# Patient Record
Sex: MALE | Race: ASIAN | NOT HISPANIC OR LATINO | URBAN - METROPOLITAN AREA
[De-identification: names, ages, dates, MRNs, and addresses within clinical notes are randomized per-mention and may not be internally consistent; named-entity substitution may affect disease eponyms.]

---

## 2018-04-18 ENCOUNTER — INPATIENT (INPATIENT)
Facility: HOSPITAL | Age: 73
LOS: 2 days | Discharge: HOME | DRG: 439 | End: 2018-04-21
Attending: INTERNAL MEDICINE | Admitting: INTERNAL MEDICINE
Payer: MEDICARE

## 2018-04-18 VITALS
RESPIRATION RATE: 17 BRPM | OXYGEN SATURATION: 96 % | DIASTOLIC BLOOD PRESSURE: 80 MMHG | TEMPERATURE: 98 F | HEART RATE: 67 BPM | SYSTOLIC BLOOD PRESSURE: 172 MMHG

## 2018-04-18 DIAGNOSIS — E11.9 TYPE 2 DIABETES MELLITUS WITHOUT COMPLICATIONS: ICD-10-CM

## 2018-04-18 DIAGNOSIS — I10 ESSENTIAL (PRIMARY) HYPERTENSION: ICD-10-CM

## 2018-04-18 DIAGNOSIS — K85.90 ACUTE PANCREATITIS WITHOUT NECROSIS OR INFECTION, UNSPECIFIED: ICD-10-CM

## 2018-04-18 DIAGNOSIS — I63.9 CEREBRAL INFARCTION, UNSPECIFIED: ICD-10-CM

## 2018-04-18 DIAGNOSIS — Z29.9 ENCOUNTER FOR PROPHYLACTIC MEASURES, UNSPECIFIED: ICD-10-CM

## 2018-04-18 DIAGNOSIS — N17.9 ACUTE KIDNEY FAILURE, UNSPECIFIED: ICD-10-CM

## 2018-04-18 LAB
ALBUMIN SERPL ELPH-MCNC: 3.6 G/DL — SIGNIFICANT CHANGE UP (ref 3.5–5)
ALP SERPL-CCNC: 72 U/L — SIGNIFICANT CHANGE UP (ref 40–120)
ALT FLD-CCNC: 26 U/L DA — SIGNIFICANT CHANGE UP (ref 10–60)
ANION GAP SERPL CALC-SCNC: 8 MMOL/L — SIGNIFICANT CHANGE UP (ref 5–17)
AST SERPL-CCNC: 23 U/L — SIGNIFICANT CHANGE UP (ref 10–40)
BASOPHILS # BLD AUTO: 0.1 K/UL — SIGNIFICANT CHANGE UP (ref 0–0.2)
BASOPHILS NFR BLD AUTO: 1.4 % — SIGNIFICANT CHANGE UP (ref 0–2)
BILIRUB SERPL-MCNC: 0.8 MG/DL — SIGNIFICANT CHANGE UP (ref 0.2–1.2)
BUN SERPL-MCNC: 35 MG/DL — HIGH (ref 7–18)
CALCIUM SERPL-MCNC: 10 MG/DL — SIGNIFICANT CHANGE UP (ref 8.4–10.5)
CHLORIDE SERPL-SCNC: 99 MMOL/L — SIGNIFICANT CHANGE UP (ref 96–108)
CO2 SERPL-SCNC: 29 MMOL/L — SIGNIFICANT CHANGE UP (ref 22–31)
CREAT SERPL-MCNC: 1.99 MG/DL — HIGH (ref 0.5–1.3)
EOSINOPHIL # BLD AUTO: 0.5 K/UL — SIGNIFICANT CHANGE UP (ref 0–0.5)
EOSINOPHIL NFR BLD AUTO: 5.2 % — SIGNIFICANT CHANGE UP (ref 0–6)
GLUCOSE BLDC GLUCOMTR-MCNC: 157 MG/DL — HIGH (ref 70–99)
GLUCOSE SERPL-MCNC: 128 MG/DL — HIGH (ref 70–99)
HCT VFR BLD CALC: 49.7 % — SIGNIFICANT CHANGE UP (ref 39–50)
HGB BLD-MCNC: 15.8 G/DL — SIGNIFICANT CHANGE UP (ref 13–17)
LIDOCAIN IGE QN: 1444 U/L — HIGH (ref 73–393)
LYMPHOCYTES # BLD AUTO: 3.2 K/UL — SIGNIFICANT CHANGE UP (ref 1–3.3)
LYMPHOCYTES # BLD AUTO: 31.2 % — SIGNIFICANT CHANGE UP (ref 13–44)
MCHC RBC-ENTMCNC: 30.1 PG — SIGNIFICANT CHANGE UP (ref 27–34)
MCHC RBC-ENTMCNC: 31.7 GM/DL — LOW (ref 32–36)
MCV RBC AUTO: 95 FL — SIGNIFICANT CHANGE UP (ref 80–100)
MONOCYTES # BLD AUTO: 0.5 K/UL — SIGNIFICANT CHANGE UP (ref 0–0.9)
MONOCYTES NFR BLD AUTO: 5.2 % — SIGNIFICANT CHANGE UP (ref 2–14)
NEUTROPHILS # BLD AUTO: 5.8 K/UL — SIGNIFICANT CHANGE UP (ref 1.8–7.4)
NEUTROPHILS NFR BLD AUTO: 57 % — SIGNIFICANT CHANGE UP (ref 43–77)
PLATELET # BLD AUTO: 263 K/UL — SIGNIFICANT CHANGE UP (ref 150–400)
POTASSIUM SERPL-MCNC: 4.4 MMOL/L — SIGNIFICANT CHANGE UP (ref 3.5–5.3)
POTASSIUM SERPL-SCNC: 4.4 MMOL/L — SIGNIFICANT CHANGE UP (ref 3.5–5.3)
PROT SERPL-MCNC: 8.4 G/DL — HIGH (ref 6–8.3)
RBC # BLD: 5.24 M/UL — SIGNIFICANT CHANGE UP (ref 4.2–5.8)
RBC # FLD: 13.2 % — SIGNIFICANT CHANGE UP (ref 10.3–14.5)
SODIUM SERPL-SCNC: 136 MMOL/L — SIGNIFICANT CHANGE UP (ref 135–145)
TROPONIN I SERPL-MCNC: <0.015 NG/ML — SIGNIFICANT CHANGE UP (ref 0–0.04)
WBC # BLD: 10.1 K/UL — SIGNIFICANT CHANGE UP (ref 3.8–10.5)
WBC # FLD AUTO: 10.1 K/UL — SIGNIFICANT CHANGE UP (ref 3.8–10.5)

## 2018-04-18 PROCEDURE — 74176 CT ABD & PELVIS W/O CONTRAST: CPT | Mod: 26

## 2018-04-18 PROCEDURE — 99285 EMERGENCY DEPT VISIT HI MDM: CPT

## 2018-04-18 RX ORDER — ATORVASTATIN CALCIUM 80 MG/1
40 TABLET, FILM COATED ORAL AT BEDTIME
Qty: 0 | Refills: 0 | Status: DISCONTINUED | OUTPATIENT
Start: 2018-04-18 | End: 2018-04-21

## 2018-04-18 RX ORDER — MORPHINE SULFATE 50 MG/1
1 CAPSULE, EXTENDED RELEASE ORAL EVERY 6 HOURS
Qty: 0 | Refills: 0 | Status: DISCONTINUED | OUTPATIENT
Start: 2018-04-18 | End: 2018-04-21

## 2018-04-18 RX ORDER — SOD,AMMONIUM,POTASSIUM LACTATE
1 CREAM (GRAM) TOPICAL
Qty: 0 | Refills: 0 | Status: DISCONTINUED | OUTPATIENT
Start: 2018-04-18 | End: 2018-04-21

## 2018-04-18 RX ORDER — SODIUM CHLORIDE 9 MG/ML
1000 INJECTION, SOLUTION INTRAVENOUS
Qty: 0 | Refills: 0 | Status: DISCONTINUED | OUTPATIENT
Start: 2018-04-18 | End: 2018-04-20

## 2018-04-18 RX ORDER — MORPHINE SULFATE 50 MG/1
2 CAPSULE, EXTENDED RELEASE ORAL ONCE
Qty: 0 | Refills: 0 | Status: DISCONTINUED | OUTPATIENT
Start: 2018-04-18 | End: 2018-04-18

## 2018-04-18 RX ORDER — LATANOPROST 0.05 MG/ML
1 SOLUTION/ DROPS OPHTHALMIC; TOPICAL AT BEDTIME
Qty: 0 | Refills: 0 | Status: DISCONTINUED | OUTPATIENT
Start: 2018-04-18 | End: 2018-04-21

## 2018-04-18 RX ORDER — ASPIRIN/CALCIUM CARB/MAGNESIUM 324 MG
81 TABLET ORAL DAILY
Qty: 0 | Refills: 0 | Status: DISCONTINUED | OUTPATIENT
Start: 2018-04-18 | End: 2018-04-21

## 2018-04-18 RX ORDER — INSULIN LISPRO 100/ML
VIAL (ML) SUBCUTANEOUS
Qty: 0 | Refills: 0 | Status: DISCONTINUED | OUTPATIENT
Start: 2018-04-18 | End: 2018-04-21

## 2018-04-18 RX ORDER — SODIUM CHLORIDE 9 MG/ML
1000 INJECTION INTRAMUSCULAR; INTRAVENOUS; SUBCUTANEOUS ONCE
Qty: 0 | Refills: 0 | Status: COMPLETED | OUTPATIENT
Start: 2018-04-18 | End: 2018-04-18

## 2018-04-18 RX ADMIN — MORPHINE SULFATE 2 MILLIGRAM(S): 50 CAPSULE, EXTENDED RELEASE ORAL at 16:27

## 2018-04-18 RX ADMIN — SODIUM CHLORIDE 1000 MILLILITER(S): 9 INJECTION INTRAMUSCULAR; INTRAVENOUS; SUBCUTANEOUS at 18:44

## 2018-04-18 RX ADMIN — MORPHINE SULFATE 2 MILLIGRAM(S): 50 CAPSULE, EXTENDED RELEASE ORAL at 18:45

## 2018-04-18 NOTE — H&P ADULT - PROBLEM SELECTOR PLAN 1
Patient presented with abdominal pain x 4 days  Labs significant for elevated Lipase  CT abdomen negative for gall stone  Denies alcohol use.   NPO. advance diet clinically. IV hydration with LR. Patient presented with abdominal pain x 4 days  Labs significant for elevated Lipase  CT abdomen negative for gall stone  Denies alcohol use. TG minimally elevated (not significant for pancreatitis)  NPO. advance diet clinically. IV hydration with LR. Patient presented with abdominal pain x 4 days  Labs significant for elevated Lipase  CT abdomen negative for gall stone  Denies alcohol use. TG minimally elevated (not significant for pancreatitis)  NPO. advance diet clinically. IV hydration with LR.  GI Dr Hardin

## 2018-04-18 NOTE — H&P ADULT - NSHPLABSRESULTS_GEN_ALL_CORE
Vital Signs Last 24 Hrs  T(C): 36.3 (18 Apr 2018 23:51), Max: 37.1 (18 Apr 2018 21:18)  T(F): 97.4 (18 Apr 2018 23:51), Max: 98.7 (18 Apr 2018 21:18)  HR: 70 (18 Apr 2018 23:51) (67 - 78)  BP: 170/76 (18 Apr 2018 23:51) (136/64 - 172/80)  BP(mean): --  RR: 18 (18 Apr 2018 23:51) (17 - 18)  SpO2: 98% (18 Apr 2018 23:51) (96% - 99%)

## 2018-04-18 NOTE — ED PROVIDER NOTE - MEDICAL DECISION MAKING DETAILS
73 y/o M pt presents with generalized abdominal pain x3-4 days. Pt is tender on examination. Will check CT Abd/Pel as well as labs, EKG, UA, and will reassess.

## 2018-04-18 NOTE — H&P ADULT - HISTORY OF PRESENT ILLNESS
71 y/o M pt with PMHx of CVA, HTN and DM sent by PMD to ED c/o generalized abdominal pain x3-4 days. Pt describes abdominal pain as non-radiating, non refered. Pt reports having constipation. Denies h/o alcohol use. Pt denies fever, chills, nausea, vomiting, CP, dysuria, burning with urination, or any other complaints.   SH: Denies alcohol use. Quit smoking 1 year ago. Denies illicit drug use

## 2018-04-18 NOTE — ED PROVIDER NOTE - CARE PLAN
Principal Discharge DX:	Acute pancreatitis, unspecified complication status, unspecified pancreatitis type  Secondary Diagnosis:	Renal insufficiency

## 2018-04-18 NOTE — H&P ADULT - PROBLEM SELECTOR PLAN 6
IMPROVE VTE score: 2, dvt prophylaxis.  [ ] Previous VTE                                                3  [ ] Thrombophilia                                             2  [ ] Lower limb paralysis                                  2        (unable to hold up >15 seconds)    [ ] Current Cancer (within 6 months)            2   [x] Immobilization > 24 hrs                              1  [ ] ICU/CCU stay > 24 hours                            1  [x] Age > 60                                                         1

## 2018-04-18 NOTE — ED PROVIDER NOTE - CONDUCTED A DETAILED DISCUSSION WITH PATIENT AND/OR GUARDIAN REGARDING, MDM
need for outpatient follow-up/lab results/radiology results radiology results/need to admit/lab results

## 2018-04-18 NOTE — ED PROVIDER NOTE - PROGRESS NOTE DETAILS
Pt with elevated lipase c/w acute pancreatitis and abnormal renal function (no prior lab values available for comparison at this time), presumed to be acute.  Pt's son says he knows of Dx of lumbar compression fracture, not acute.  Informed Dr. Glenis Nunes for admission.

## 2018-04-18 NOTE — H&P ADULT - PROBLEM SELECTOR PLAN 2
Cr 1.99 with baseline around 1.2  Likely prerenal due to poor PO intake die to abdominal pain  IV fluids. Iv hydration. Follow BMP

## 2018-04-19 LAB
ALBUMIN SERPL ELPH-MCNC: 3.4 G/DL — LOW (ref 3.5–5)
ALP SERPL-CCNC: 69 U/L — SIGNIFICANT CHANGE UP (ref 40–120)
ALT FLD-CCNC: 29 U/L DA — SIGNIFICANT CHANGE UP (ref 10–60)
ANION GAP SERPL CALC-SCNC: 8 MMOL/L — SIGNIFICANT CHANGE UP (ref 5–17)
APPEARANCE UR: CLEAR — SIGNIFICANT CHANGE UP
APPEARANCE UR: CLEAR — SIGNIFICANT CHANGE UP
AST SERPL-CCNC: 24 U/L — SIGNIFICANT CHANGE UP (ref 10–40)
BILIRUB SERPL-MCNC: 1 MG/DL — SIGNIFICANT CHANGE UP (ref 0.2–1.2)
BILIRUB UR-MCNC: NEGATIVE — SIGNIFICANT CHANGE UP
BILIRUB UR-MCNC: NEGATIVE — SIGNIFICANT CHANGE UP
BUN SERPL-MCNC: 31 MG/DL — HIGH (ref 7–18)
CALCIUM SERPL-MCNC: 9.3 MG/DL — SIGNIFICANT CHANGE UP (ref 8.4–10.5)
CHLORIDE SERPL-SCNC: 100 MMOL/L — SIGNIFICANT CHANGE UP (ref 96–108)
CHOLEST SERPL-MCNC: 95 MG/DL — SIGNIFICANT CHANGE UP (ref 10–199)
CO2 SERPL-SCNC: 30 MMOL/L — SIGNIFICANT CHANGE UP (ref 22–31)
COLOR SPEC: YELLOW — SIGNIFICANT CHANGE UP
COLOR SPEC: YELLOW — SIGNIFICANT CHANGE UP
CREAT ?TM UR-MCNC: 112 MG/DL — SIGNIFICANT CHANGE UP
CREAT SERPL-MCNC: 1.84 MG/DL — HIGH (ref 0.5–1.3)
DIFF PNL FLD: NEGATIVE — SIGNIFICANT CHANGE UP
DIFF PNL FLD: NEGATIVE — SIGNIFICANT CHANGE UP
ETHANOL SERPL-MCNC: <3 MG/DL — SIGNIFICANT CHANGE UP (ref 0–10)
GLUCOSE BLDC GLUCOMTR-MCNC: 116 MG/DL — HIGH (ref 70–99)
GLUCOSE BLDC GLUCOMTR-MCNC: 117 MG/DL — HIGH (ref 70–99)
GLUCOSE BLDC GLUCOMTR-MCNC: 140 MG/DL — HIGH (ref 70–99)
GLUCOSE BLDC GLUCOMTR-MCNC: 152 MG/DL — HIGH (ref 70–99)
GLUCOSE SERPL-MCNC: 170 MG/DL — HIGH (ref 70–99)
GLUCOSE UR QL: NEGATIVE — SIGNIFICANT CHANGE UP
GLUCOSE UR QL: NEGATIVE — SIGNIFICANT CHANGE UP
HBA1C BLD-MCNC: 11.6 % — HIGH (ref 4–5.6)
HDLC SERPL-MCNC: 36 MG/DL — LOW (ref 40–125)
KETONES UR-MCNC: NEGATIVE — SIGNIFICANT CHANGE UP
KETONES UR-MCNC: NEGATIVE — SIGNIFICANT CHANGE UP
LEUKOCYTE ESTERASE UR-ACNC: NEGATIVE — SIGNIFICANT CHANGE UP
LEUKOCYTE ESTERASE UR-ACNC: NEGATIVE — SIGNIFICANT CHANGE UP
LIPID PNL WITH DIRECT LDL SERPL: 47 MG/DL — SIGNIFICANT CHANGE UP
MAGNESIUM SERPL-MCNC: 2.2 MG/DL — SIGNIFICANT CHANGE UP (ref 1.6–2.6)
NITRITE UR-MCNC: NEGATIVE — SIGNIFICANT CHANGE UP
NITRITE UR-MCNC: NEGATIVE — SIGNIFICANT CHANGE UP
OSMOLALITY UR: 564 MOS/KG — SIGNIFICANT CHANGE UP (ref 50–1200)
PH UR: 6 — SIGNIFICANT CHANGE UP (ref 5–8)
PH UR: 7 — SIGNIFICANT CHANGE UP (ref 5–8)
PHOSPHATE SERPL-MCNC: 3.5 MG/DL — SIGNIFICANT CHANGE UP (ref 2.5–4.5)
POTASSIUM SERPL-MCNC: 5 MMOL/L — SIGNIFICANT CHANGE UP (ref 3.5–5.3)
POTASSIUM SERPL-SCNC: 5 MMOL/L — SIGNIFICANT CHANGE UP (ref 3.5–5.3)
PROT SERPL-MCNC: 7.8 G/DL — SIGNIFICANT CHANGE UP (ref 6–8.3)
PROT UR-MCNC: 100
PROT UR-MCNC: 100
SODIUM SERPL-SCNC: 138 MMOL/L — SIGNIFICANT CHANGE UP (ref 135–145)
SODIUM UR-SCNC: 72 MMOL/L — SIGNIFICANT CHANGE UP (ref 40–220)
SP GR SPEC: 1.01 — SIGNIFICANT CHANGE UP (ref 1.01–1.02)
SP GR SPEC: 1.02 — SIGNIFICANT CHANGE UP (ref 1.01–1.02)
TOTAL CHOLESTEROL/HDL RATIO MEASUREMENT: 2.6 RATIO — LOW (ref 3.4–9.6)
TRIGL SERPL-MCNC: 237 MG/DL — HIGH (ref 10–149)
TRIGL SERPL-MCNC: 62 MG/DL — SIGNIFICANT CHANGE UP (ref 10–149)
TSH SERPL-MCNC: 2.47 UU/ML — SIGNIFICANT CHANGE UP (ref 0.34–4.82)
UROBILINOGEN FLD QL: NEGATIVE — SIGNIFICANT CHANGE UP
UROBILINOGEN FLD QL: NEGATIVE — SIGNIFICANT CHANGE UP
VIT B12 SERPL-MCNC: 688 PG/ML — SIGNIFICANT CHANGE UP (ref 232–1245)

## 2018-04-19 RX ORDER — AMLODIPINE BESYLATE AND BENAZEPRIL HYDROCHLORIDE 10; 20 MG/1; MG/1
0 CAPSULE ORAL
Qty: 30 | Refills: 0 | COMMUNITY

## 2018-04-19 RX ORDER — LATANOPROST 0.05 MG/ML
0 SOLUTION/ DROPS OPHTHALMIC; TOPICAL
Qty: 2.5 | Refills: 0 | COMMUNITY

## 2018-04-19 RX ORDER — INSULIN GLARGINE 100 [IU]/ML
10 INJECTION, SOLUTION SUBCUTANEOUS AT BEDTIME
Qty: 0 | Refills: 0 | Status: DISCONTINUED | OUTPATIENT
Start: 2018-04-19 | End: 2018-04-21

## 2018-04-19 RX ORDER — AMLODIPINE BESYLATE 2.5 MG/1
5 TABLET ORAL DAILY
Qty: 0 | Refills: 0 | Status: DISCONTINUED | OUTPATIENT
Start: 2018-04-19 | End: 2018-04-21

## 2018-04-19 RX ADMIN — Medication 1 APPLICATION(S): at 06:08

## 2018-04-19 RX ADMIN — Medication 1 APPLICATION(S): at 02:39

## 2018-04-19 RX ADMIN — Medication 1 APPLICATION(S): at 17:13

## 2018-04-19 RX ADMIN — LATANOPROST 1 DROP(S): 0.05 SOLUTION/ DROPS OPHTHALMIC; TOPICAL at 21:35

## 2018-04-19 RX ADMIN — LATANOPROST 1 DROP(S): 0.05 SOLUTION/ DROPS OPHTHALMIC; TOPICAL at 02:40

## 2018-04-19 RX ADMIN — Medication 81 MILLIGRAM(S): at 11:27

## 2018-04-19 RX ADMIN — AMLODIPINE BESYLATE 5 MILLIGRAM(S): 2.5 TABLET ORAL at 01:17

## 2018-04-19 RX ADMIN — SODIUM CHLORIDE 150 MILLILITER(S): 9 INJECTION, SOLUTION INTRAVENOUS at 01:25

## 2018-04-19 RX ADMIN — ATORVASTATIN CALCIUM 40 MILLIGRAM(S): 80 TABLET, FILM COATED ORAL at 21:34

## 2018-04-19 RX ADMIN — INSULIN GLARGINE 10 UNIT(S): 100 INJECTION, SOLUTION SUBCUTANEOUS at 21:34

## 2018-04-19 NOTE — PROGRESS NOTE ADULT - ASSESSMENT
73 y/o M pt with PMHx of CVA, HTN and DM sent by PMD to ED c/o generalized abdominal pain x3-4 days admitted for acute pancreatitis.     1. Acute pancreatitis  - unclear etiology ; no gall stones seen on CT hence will get US   - alcohol and urine toxicology level to be done   - no history of recent instrumentation   - diet advanced to clears liquid today; patient had a BM today     2. YANNICK  - BUN/Cr <20   - will get UA and urine studies to evaluate further     3. DVT/GI ppx 71 y/o M pt with PMHx of CVA, HTN and DM sent by PMD to ED c/o generalized abdominal pain x3-4 days admitted for acute pancreatitis.     1. Acute pancreatitis  - unclear etiology ; no gall stones seen on CT hence will get US   - alcohol and urine toxicology level to be done   - no history of recent instrumentation   - diet advanced to clears liquid today; patient had a BM today     2. YANNICK  - BUN/Cr <20   - will get UA and urine studies to evaluate further     3. Poorly controlled DM   - A1c 11.6   - BG controlled   - started on lantus 10 units     3. DVT/GI ppx

## 2018-04-20 LAB
ANION GAP SERPL CALC-SCNC: 7 MMOL/L — SIGNIFICANT CHANGE UP (ref 5–17)
BASOPHILS # BLD AUTO: 0.1 K/UL — SIGNIFICANT CHANGE UP (ref 0–0.2)
BASOPHILS NFR BLD AUTO: 1.3 % — SIGNIFICANT CHANGE UP (ref 0–2)
BUN SERPL-MCNC: 20 MG/DL — HIGH (ref 7–18)
CALCIUM SERPL-MCNC: 8.9 MG/DL — SIGNIFICANT CHANGE UP (ref 8.4–10.5)
CHLORIDE SERPL-SCNC: 102 MMOL/L — SIGNIFICANT CHANGE UP (ref 96–108)
CO2 SERPL-SCNC: 29 MMOL/L — SIGNIFICANT CHANGE UP (ref 22–31)
CREAT SERPL-MCNC: 1.52 MG/DL — HIGH (ref 0.5–1.3)
EOSINOPHIL # BLD AUTO: 0.6 K/UL — HIGH (ref 0–0.5)
EOSINOPHIL NFR BLD AUTO: 8 % — HIGH (ref 0–6)
GLUCOSE BLDC GLUCOMTR-MCNC: 123 MG/DL — HIGH (ref 70–99)
GLUCOSE BLDC GLUCOMTR-MCNC: 155 MG/DL — HIGH (ref 70–99)
GLUCOSE BLDC GLUCOMTR-MCNC: 158 MG/DL — HIGH (ref 70–99)
GLUCOSE BLDC GLUCOMTR-MCNC: 163 MG/DL — HIGH (ref 70–99)
GLUCOSE SERPL-MCNC: 155 MG/DL — HIGH (ref 70–99)
HCT VFR BLD CALC: 47.5 % — SIGNIFICANT CHANGE UP (ref 39–50)
HGB BLD-MCNC: 15 G/DL — SIGNIFICANT CHANGE UP (ref 13–17)
LIDOCAIN IGE QN: 270 U/L — SIGNIFICANT CHANGE UP (ref 73–393)
LYMPHOCYTES # BLD AUTO: 2 K/UL — SIGNIFICANT CHANGE UP (ref 1–3.3)
LYMPHOCYTES # BLD AUTO: 25.1 % — SIGNIFICANT CHANGE UP (ref 13–44)
MCHC RBC-ENTMCNC: 30.1 PG — SIGNIFICANT CHANGE UP (ref 27–34)
MCHC RBC-ENTMCNC: 31.5 GM/DL — LOW (ref 32–36)
MCV RBC AUTO: 95.3 FL — SIGNIFICANT CHANGE UP (ref 80–100)
MONOCYTES # BLD AUTO: 0.7 K/UL — SIGNIFICANT CHANGE UP (ref 0–0.9)
MONOCYTES NFR BLD AUTO: 9.2 % — SIGNIFICANT CHANGE UP (ref 2–14)
NEUTROPHILS # BLD AUTO: 4.5 K/UL — SIGNIFICANT CHANGE UP (ref 1.8–7.4)
NEUTROPHILS NFR BLD AUTO: 56.4 % — SIGNIFICANT CHANGE UP (ref 43–77)
PLATELET # BLD AUTO: 270 K/UL — SIGNIFICANT CHANGE UP (ref 150–400)
POTASSIUM SERPL-MCNC: 4.3 MMOL/L — SIGNIFICANT CHANGE UP (ref 3.5–5.3)
POTASSIUM SERPL-SCNC: 4.3 MMOL/L — SIGNIFICANT CHANGE UP (ref 3.5–5.3)
RBC # BLD: 4.99 M/UL — SIGNIFICANT CHANGE UP (ref 4.2–5.8)
RBC # FLD: 13 % — SIGNIFICANT CHANGE UP (ref 10.3–14.5)
SODIUM SERPL-SCNC: 138 MMOL/L — SIGNIFICANT CHANGE UP (ref 135–145)
WBC # BLD: 8 K/UL — SIGNIFICANT CHANGE UP (ref 3.8–10.5)
WBC # FLD AUTO: 8 K/UL — SIGNIFICANT CHANGE UP (ref 3.8–10.5)

## 2018-04-20 PROCEDURE — 76700 US EXAM ABDOM COMPLETE: CPT | Mod: 26

## 2018-04-20 RX ORDER — SODIUM CHLORIDE 9 MG/ML
1000 INJECTION, SOLUTION INTRAVENOUS
Qty: 0 | Refills: 0 | Status: DISCONTINUED | OUTPATIENT
Start: 2018-04-20 | End: 2018-04-21

## 2018-04-20 RX ADMIN — Medication 81 MILLIGRAM(S): at 11:05

## 2018-04-20 RX ADMIN — LATANOPROST 1 DROP(S): 0.05 SOLUTION/ DROPS OPHTHALMIC; TOPICAL at 22:13

## 2018-04-20 RX ADMIN — SODIUM CHLORIDE 200 MILLILITER(S): 9 INJECTION, SOLUTION INTRAVENOUS at 22:11

## 2018-04-20 RX ADMIN — INSULIN GLARGINE 10 UNIT(S): 100 INJECTION, SOLUTION SUBCUTANEOUS at 21:30

## 2018-04-20 RX ADMIN — ATORVASTATIN CALCIUM 40 MILLIGRAM(S): 80 TABLET, FILM COATED ORAL at 22:11

## 2018-04-20 RX ADMIN — Medication 1: at 07:58

## 2018-04-20 RX ADMIN — Medication 1 APPLICATION(S): at 18:11

## 2018-04-20 RX ADMIN — Medication 1: at 16:33

## 2018-04-20 RX ADMIN — AMLODIPINE BESYLATE 5 MILLIGRAM(S): 2.5 TABLET ORAL at 05:16

## 2018-04-20 RX ADMIN — Medication 1 APPLICATION(S): at 05:16

## 2018-04-20 RX ADMIN — SODIUM CHLORIDE 200 MILLILITER(S): 9 INJECTION, SOLUTION INTRAVENOUS at 11:05

## 2018-04-20 NOTE — PROGRESS NOTE ADULT - ASSESSMENT
73 y/o M pt with PMHx of CVA, HTN and DM sent by PMD to ED c/o generalized abdominal pain x3-4 days admitted for acute pancreatitis.     1. Acute pancreatitis  - unclear etiology ; no gall stones seen on CT hence will get US   - alcohol and urine toxicology level to be done   - no history of recent instrumentation   - diet advanced to clears liquid today; patient had a BM today   gi follow up  2. YANNICK  - BUN/Cr <20   - will get UA and urine studies to evaluate further   ivf     3. Poorly controlled DM   - A1c 11.6   - BG controlled   - started on lantus 10 units       3. DVT/GI ppx

## 2018-04-20 NOTE — PROGRESS NOTE ADULT - ASSESSMENT
1. Abdominal pain (improving)  2. Pancreatitis    Suggestions:    1. Advance diet as tolerated  2. Protonix daily  3. Avoid NSAID  4. DVT prophylaxis

## 2018-04-20 NOTE — CONSULT NOTE ADULT - ASSESSMENT
73 y/o M pt with PMHx of CVA, HTN and DM sent by PMD to ED c/o generalized abdominal pain x3-4 days. Found to have un cont dm, Pt admits to taking all his meds given by his son. Does not recall fsg #
The etiology for abdominal pain in this patient can be due to:  1. Pancreatitis    Suggestions:    1. Follow up Lipase  2. NPO  3. IVF hyadration  4. Avoid NSAID  5. Abdominal sonogram  6. Protonix daily  7. DVT prophylaxis  8. Lipid profile

## 2018-04-20 NOTE — CONSULT NOTE ADULT - PROBLEM SELECTOR RECOMMENDATION 9
un cont with [id number]  agree with lantus 10 units  humalog prn for now  consider prandin and metformin as out pt  d/c januvia  d/w hs

## 2018-04-20 NOTE — PROGRESS NOTE ADULT - SUBJECTIVE AND OBJECTIVE BOX
INTERVAL HPI/ OVERNIGHT EVENTS: No major overnight events     VITAL SIGNS:  T(F): 97.6 (04-19-18 @ 05:30)  HR: 100 (04-19-18 @ 05:30)  BP: 156/78 (04-19-18 @ 05:30)  RR: 18 (04-19-18 @ 05:30)  SpO2: 100% (04-19-18 @ 05:30)  Wt(kg): --    PHYSICAL EXAM:    Constitutional: NAD, patient is alert and awake   Eyes: PERRL, sclera clear   ENMT: no external lesions   Neck: Supple, no JVD   Respiratory: CTAB  Cardiovascular: S1,S2 +, RMG neg  Gastrointestinal: soft, mild diffuse abdominal tenderness, BS + x 4  Extremities: no cyanosis, edema or clubbing   Vascular: pulses 2+  Neurological: AO x 3      MEDICATIONS  (STANDING):  amLODIPine   Tablet 5 milliGRAM(s) Oral daily  ammonium lactate 12% Lotion 1 Application(s) Topical two times a day  aspirin  chewable 81 milliGRAM(s) Oral daily  atorvastatin 40 milliGRAM(s) Oral at bedtime  insulin lispro (HumaLOG) corrective regimen sliding scale   SubCutaneous three times a day before meals  lactated ringers. 1000 milliLiter(s) (150 mL/Hr) IV Continuous <Continuous>  latanoprost 0.005% Ophthalmic Solution 1 Drop(s) Both EYES at bedtime    MEDICATIONS  (PRN):  morphine  - Injectable 1 milliGRAM(s) IV Push every 6 hours PRN Severe Pain (7 - 10)      Allergies    No Known Allergies    Intolerances        LABS:                                              15.8   10.1  )-----------( 263      ( 18 Apr 2018 16:24 )             49.7   04-19    138  |  100  |  31<H>  ----------------------------<  170<H>  5.0   |  30  |  1.84<H>    Ca    9.3      19 Apr 2018 07:24  Phos  3.5     04-19  Mg     2.2     04-19    TPro  7.8  /  Alb  3.4<L>  /  TBili  1.0  /  DBili  x   /  AST  24  /  ALT  29  /  AlkPhos  69  04-19
INTERVAL HPI/ OVERNIGHT EVENTS: No major overnight events     VITAL SIGNS:  T(F): 97.6 (04-19-18 @ 05:30)  HR: 100 (04-19-18 @ 05:30)  BP: 156/78 (04-19-18 @ 05:30)  RR: 18 (04-19-18 @ 05:30)  SpO2: 100% (04-19-18 @ 05:30)  Wt(kg): --    PHYSICAL EXAM:    Constitutional: NAD, patient is alert and awake   Eyes: PERRL, sclera clear   ENMT: no external lesions   Neck: Supple, no JVD   Respiratory: CTAB  Cardiovascular: S1,S2 +, RMG neg  Gastrointestinal: soft, no tenderness or distension, BS + x 4  Extremities: no cyanosis, edema or clubbing   Vascular: pulses 2+  Neurological: AO x 3      MEDICATIONS  (STANDING):  amLODIPine   Tablet 5 milliGRAM(s) Oral daily  ammonium lactate 12% Lotion 1 Application(s) Topical two times a day  aspirin  chewable 81 milliGRAM(s) Oral daily  atorvastatin 40 milliGRAM(s) Oral at bedtime  insulin lispro (HumaLOG) corrective regimen sliding scale   SubCutaneous three times a day before meals  lactated ringers. 1000 milliLiter(s) (150 mL/Hr) IV Continuous <Continuous>  latanoprost 0.005% Ophthalmic Solution 1 Drop(s) Both EYES at bedtime    MEDICATIONS  (PRN):  morphine  - Injectable 1 milliGRAM(s) IV Push every 6 hours PRN Severe Pain (7 - 10)      Allergies    No Known Allergies    Intolerances        LABS:                                            15.0   8.0   )-----------( 270      ( 20 Apr 2018 08:06 )             47.5   04-20    138  |  102  |  20<H>  ----------------------------<  155<H>  4.3   |  29  |  1.52<H>    Ca    8.9      20 Apr 2018 08:06  Phos  3.5     04-19  Mg     2.2     04-19    TPro  7.8  /  Alb  3.4<L>  /  TBili  1.0  /  DBili  x   /  AST  24  /  ALT  29  /  AlkPhos  69  04-19
INTERVAL HPI/ OVERNIGHT EVENTS: No major overnight events   .patient seen and examined    s doing better   MEDICATIONS  (STANDING):  amLODIPine   Tablet 5 milliGRAM(s) Oral daily  ammonium lactate 12% Lotion 1 Application(s) Topical two times a day  aspirin  chewable 81 milliGRAM(s) Oral daily  atorvastatin 40 milliGRAM(s) Oral at bedtime  insulin glargine Injectable (LANTUS) 10 Unit(s) SubCutaneous at bedtime  insulin lispro (HumaLOG) corrective regimen sliding scale   SubCutaneous three times a day before meals  lactated ringers. 1000 milliLiter(s) (150 mL/Hr) IV Continuous <Continuous>  latanoprost 0.005% Ophthalmic Solution 1 Drop(s) Both EYES at bedtime    MEDICATIONS  (PRN):  morphine  - Injectable 1 milliGRAM(s) IV Push every 6 hours PRN Severe Pain (7 - 10)      Vital Signs Last 24 Hrs  T(C): 36.5 (2018 05:08), Max: 37 (2018 14:20)  T(F): 97.7 (2018 05:08), Max: 98.6 (2018 14:20)  HR: 106 (2018 05:08) (70 - 106)  BP: 167/85 (2018 05:08) (129/57 - 171/80)  BP(mean): --  RR: 18 (2018 05:08) (18 - 18)  SpO2: 95% (2018 05:08) (94% - 98%)  PHYSICAL EXAM:    Constitutional: NAD, patient is alert and awake   Eyes: PERRL, sclera clear   ENMT: no external lesions   Neck: Supple, no JVD   Respiratory: CTAB  Cardiovascular: S1,S2 +, RMG neg  Gastrointestinal: soft, mild diffuse abdominal tenderness, BS + x 4  Extremities: no cyanosis, edema or clubbing   Vascular: pulses 2+  Neurological: AO x 3    .  Allergies    No Known Allergies    Intolerances      LABS:                        15.0   8.0   )-----------( 270      ( 2018 08:06 )             47.5     04-    138  |  100  |  31<H>  ----------------------------<  170<H>  5.0   |  30  |  1.84<H>    Ca    9.3      2018 07:24  Phos  3.5     04-19  Mg     2.2     -    TPro  7.8  /  Alb  3.4<L>  /  TBili  1.0  /  DBili  x   /  AST  24  /  ALT  29  /  AlkPhos  69        Urinalysis Basic - ( 2018 11:51 )    Color: Yellow / Appearance: Clear / S.010 / pH: x  Gluc: x / Ketone: Negative  / Bili: Negative / Urobili: Negative   Blood: x / Protein: 100 / Nitrite: Negative   Leuk Esterase: Negative / RBC: 0-2 /HPF / WBC 0-2 /HPF   Sq Epi: x / Non Sq Epi: Occasional /HPF / Bacteria: x        CARDIAC MARKERS ( 2018 16:24 )  <0.015 ng/mL / x     / x     / x     / x        Lipase, Serum: 1444 U/L (18 @ 16:24)                  LABS:                                              15.8   10.1  )-----------( 263      ( 2018 16:24 )             49.7   19    138  |  100  |  31<H>  ----------------------------<  170<H>  5.0   |  30  |  1.84<H>    Ca    9.3      2018 07:24  Phos  3.5     04-19  Mg     2.2     04-    TPro  7.8  /  Alb  3.4<L>  /  TBili  1.0  /  DBili  x   /  AST  24  /  ALT  29  /  AlkPhos  69  
[   ] ICU                                          [   ] CCU                                      [  X ] Medical Floor    Patient is comfortable. No new complaints reported. Patient reports less abdominal pain. No N/V, hematemesis, hematochezia, melena, fever, chills, chest pain, SOB, cough or diarrhea reported.    VITALS  Vital Signs Last 24 Hrs  T(C): 36.9 (20 Apr 2018 14:28), Max: 36.9 (20 Apr 2018 14:28)  T(F): 98.4 (20 Apr 2018 14:28), Max: 98.4 (20 Apr 2018 14:28)  HR: 96 (20 Apr 2018 14:28) (70 - 106)  BP: 167/82 (20 Apr 2018 14:28) (156/76 - 171/80)  BP(mean): --  RR: 18 (20 Apr 2018 14:28) (18 - 18)  SpO2: 99% (20 Apr 2018 14:28) (94% - 99%)         MEDICATIONS  (STANDING):  amLODIPine   Tablet 5 milliGRAM(s) Oral daily  ammonium lactate 12% Lotion 1 Application(s) Topical two times a day  aspirin  chewable 81 milliGRAM(s) Oral daily  atorvastatin 40 milliGRAM(s) Oral at bedtime  insulin glargine Injectable (LANTUS) 10 Unit(s) SubCutaneous at bedtime  insulin lispro (HumaLOG) corrective regimen sliding scale   SubCutaneous three times a day before meals  lactated ringers. 1000 milliLiter(s) (200 mL/Hr) IV Continuous <Continuous>  latanoprost 0.005% Ophthalmic Solution 1 Drop(s) Both EYES at bedtime    MEDICATIONS  (PRN):  morphine  - Injectable 1 milliGRAM(s) IV Push every 6 hours PRN Severe Pain (7 - 10)                            15.0   8.0   )-----------( 270      ( 20 Apr 2018 08:06 )             47.5       04-20    138  |  102  |  20<H>  ----------------------------<  155<H>  4.3   |  29  |  1.52<H>    Ca    8.9      20 Apr 2018 08:06  Phos  3.5     04-19  Mg     2.2     04-19    TPro  7.8  /  Alb  3.4<L>  /  TBili  1.0  /  DBili  x   /  AST  24  /  ALT  29  /  AlkPhos  69  04-19

## 2018-04-20 NOTE — PROGRESS NOTE ADULT - ASSESSMENT
71 y/o M pt with PMHx of CVA, HTN and DM sent by PMD to ED c/o generalized abdominal pain x3-4 days admitted for acute pancreatitis.     1. Acute pancreatitis  - spoke to son today, patient was recently started on Januvia (100 bid) 15 days ago  - no gall stones seen on CT hence will get US   - alcohol and urine toxicology level to be done   - no history of recent instrumentation   - diet advanced to soft today; patient had a BM today   - Dr Hardin, GI       2. YANNICK  - BUN/Cr <20   - Fena is 0.7 indicative of pre-renal etiology   - continue iv fluids   - Cr is 1.5 today     3. Poorly controlled DM   - A1c 11.6   - BG controlled   - started on lantus 10 units   - Dr Ji, endocrinology     3. DVT/GI ppx

## 2018-04-20 NOTE — PROGRESS NOTE ADULT - NEGATIVE ENMT SYMPTOMS
no throat pain/no dysphagia/no gum bleeding/no dry mouth/no nose bleeds/no hearing difficulty/no ear pain

## 2018-04-21 VITALS
RESPIRATION RATE: 19 BRPM | TEMPERATURE: 98 F | OXYGEN SATURATION: 98 % | HEART RATE: 87 BPM | DIASTOLIC BLOOD PRESSURE: 81 MMHG | SYSTOLIC BLOOD PRESSURE: 150 MMHG

## 2018-04-21 LAB
GLUCOSE BLDC GLUCOMTR-MCNC: 110 MG/DL — HIGH (ref 70–99)
GLUCOSE BLDC GLUCOMTR-MCNC: 128 MG/DL — HIGH (ref 70–99)

## 2018-04-21 PROCEDURE — 83690 ASSAY OF LIPASE: CPT

## 2018-04-21 PROCEDURE — 83935 ASSAY OF URINE OSMOLALITY: CPT

## 2018-04-21 PROCEDURE — 82607 VITAMIN B-12: CPT

## 2018-04-21 PROCEDURE — 84100 ASSAY OF PHOSPHORUS: CPT

## 2018-04-21 PROCEDURE — 84478 ASSAY OF TRIGLYCERIDES: CPT

## 2018-04-21 PROCEDURE — 83036 HEMOGLOBIN GLYCOSYLATED A1C: CPT

## 2018-04-21 PROCEDURE — 93005 ELECTROCARDIOGRAM TRACING: CPT

## 2018-04-21 PROCEDURE — 74176 CT ABD & PELVIS W/O CONTRAST: CPT

## 2018-04-21 PROCEDURE — 80048 BASIC METABOLIC PNL TOTAL CA: CPT

## 2018-04-21 PROCEDURE — 76700 US EXAM ABDOM COMPLETE: CPT

## 2018-04-21 PROCEDURE — 80307 DRUG TEST PRSMV CHEM ANLYZR: CPT

## 2018-04-21 PROCEDURE — 99285 EMERGENCY DEPT VISIT HI MDM: CPT | Mod: 25

## 2018-04-21 PROCEDURE — 84443 ASSAY THYROID STIM HORMONE: CPT

## 2018-04-21 PROCEDURE — 84484 ASSAY OF TROPONIN QUANT: CPT

## 2018-04-21 PROCEDURE — 80061 LIPID PANEL: CPT

## 2018-04-21 PROCEDURE — 82962 GLUCOSE BLOOD TEST: CPT

## 2018-04-21 PROCEDURE — 83735 ASSAY OF MAGNESIUM: CPT

## 2018-04-21 PROCEDURE — 81001 URINALYSIS AUTO W/SCOPE: CPT

## 2018-04-21 PROCEDURE — 84300 ASSAY OF URINE SODIUM: CPT

## 2018-04-21 PROCEDURE — 80053 COMPREHEN METABOLIC PANEL: CPT

## 2018-04-21 PROCEDURE — 85027 COMPLETE CBC AUTOMATED: CPT

## 2018-04-21 PROCEDURE — 96374 THER/PROPH/DIAG INJ IV PUSH: CPT | Mod: XU

## 2018-04-21 PROCEDURE — 82570 ASSAY OF URINE CREATININE: CPT

## 2018-04-21 RX ORDER — METFORMIN HYDROCHLORIDE 850 MG/1
1 TABLET ORAL
Qty: 60 | Refills: 0 | OUTPATIENT
Start: 2018-04-21 | End: 2018-05-20

## 2018-04-21 RX ORDER — INSULIN GLARGINE 100 [IU]/ML
10 INJECTION, SOLUTION SUBCUTANEOUS
Qty: 1 | Refills: 0
Start: 2018-04-21

## 2018-04-21 RX ORDER — ASPIRIN/CALCIUM CARB/MAGNESIUM 324 MG
1 TABLET ORAL
Qty: 0 | Refills: 0 | COMMUNITY
Start: 2018-04-21

## 2018-04-21 RX ORDER — ATORVASTATIN CALCIUM 80 MG/1
1 TABLET, FILM COATED ORAL
Qty: 30 | Refills: 0 | OUTPATIENT
Start: 2018-04-21 | End: 2018-05-20

## 2018-04-21 RX ORDER — METFORMIN HYDROCHLORIDE 850 MG/1
0 TABLET ORAL
Qty: 60 | Refills: 0 | COMMUNITY

## 2018-04-21 RX ORDER — BRIMONIDINE TARTRATE, TIMOLOL MALEATE 2; 5 MG/ML; MG/ML
0 SOLUTION/ DROPS OPHTHALMIC
Qty: 5 | Refills: 0 | COMMUNITY

## 2018-04-21 RX ORDER — REPAGLINIDE 1 MG/1
1 TABLET ORAL
Qty: 90 | Refills: 0 | OUTPATIENT
Start: 2018-04-21 | End: 2018-05-20

## 2018-04-21 RX ORDER — INSULIN GLARGINE 100 [IU]/ML
10 INJECTION, SOLUTION SUBCUTANEOUS
Qty: 0 | Refills: 0 | COMMUNITY

## 2018-04-21 RX ORDER — BENAZEPRIL HYDROCHLORIDE 40 MG/1
0 TABLET ORAL
Qty: 30 | Refills: 0 | COMMUNITY

## 2018-04-21 RX ORDER — AMLODIPINE BESYLATE 2.5 MG/1
10 TABLET ORAL ONCE
Qty: 0 | Refills: 0 | Status: DISCONTINUED | OUTPATIENT
Start: 2018-04-21 | End: 2018-04-21

## 2018-04-21 RX ORDER — HYDRALAZINE HCL 50 MG
5 TABLET ORAL ONCE
Qty: 0 | Refills: 0 | Status: COMPLETED | OUTPATIENT
Start: 2018-04-21 | End: 2018-04-21

## 2018-04-21 RX ORDER — INSULIN GLARGINE 100 [IU]/ML
10 INJECTION, SOLUTION SUBCUTANEOUS
Qty: 1 | Refills: 0 | OUTPATIENT
Start: 2018-04-21 | End: 2018-05-20

## 2018-04-21 RX ORDER — SITAGLIPTIN 50 MG/1
0 TABLET, FILM COATED ORAL
Qty: 30 | Refills: 0 | COMMUNITY

## 2018-04-21 RX ORDER — PIOGLITAZONE HYDROCHLORIDE 15 MG/1
0 TABLET ORAL
Qty: 30 | Refills: 0 | COMMUNITY

## 2018-04-21 RX ADMIN — Medication 81 MILLIGRAM(S): at 13:07

## 2018-04-21 RX ADMIN — SODIUM CHLORIDE 200 MILLILITER(S): 9 INJECTION, SOLUTION INTRAVENOUS at 05:17

## 2018-04-21 RX ADMIN — AMLODIPINE BESYLATE 5 MILLIGRAM(S): 2.5 TABLET ORAL at 05:18

## 2018-04-21 RX ADMIN — Medication 1 APPLICATION(S): at 05:18

## 2018-04-21 NOTE — DISCHARGE NOTE ADULT - MEDICATION SUMMARY - MEDICATIONS TO STOP TAKING
I will STOP taking the medications listed below when I get home from the hospital:    JANUVIA      TAB 100MG

## 2018-04-21 NOTE — DISCHARGE NOTE ADULT - CARE PLAN
Principal Discharge DX:	Acute pancreatitis, unspecified complication status, unspecified pancreatitis type  Goal:	resolution and prevention of future episodes  Assessment and plan of treatment:	you had acute inflammation of your pancreas due to medications likely Januvia hence please don't take any more of that. The CT scan of abdomen and US of the abdomen did not show any hepatobiliary disease. Triglyceride level was not elevated. Please follow up with GI Dr Hardin who evaluated you in the hospital for close monitoring.  Secondary Diagnosis:	DM (diabetes mellitus)  Goal:	Keep A1c<7  Assessment and plan of treatment:	Your A1c was 11 hence we started lantus 10 units at bedtime and added prandin 0.5 tid to your metformin regimen. please continue medications as directed and follow up with endocrinology for repeat A1c in 3 months.  Secondary Diagnosis:	Renal insufficiency  Goal:	Cr<1.2  Assessment and plan of treatment:	Your renal functions were abnormal sec to pre-renal azotemia that improved significantly with iv fluids. last Cr level was 1.5. Please f/u with PMD for repeat BMP in 1 week since you are on metformin and benzapril.

## 2018-04-21 NOTE — DISCHARGE NOTE ADULT - HOSPITAL COURSE
73 y/o M pt with PMHx of HTN and DM sent by PMD to ED c/o generalized abdominal pain x3-4 days. Labs significant for elevated Lipase. CT abdomen negative for gall stone Denies alcohol use. TG minimally elevated (not significant for pancreatitis)  NPO. advance diet clinically. IV hydration with LR.  GI Dr Hardin. Cr 1.99 with baseline around 1.2 Likely prerenal due to poor PO intake die to abdominal pain IV fluids. US abdomen neg for gall bladder disease. CT abdomen showed no pathology. Diet was advanced and tolerated well. Cr improved with Iv fluids.     Per attending stable for discharge.

## 2018-04-21 NOTE — DISCHARGE NOTE ADULT - MEDICATION SUMMARY - MEDICATIONS TO TAKE
I will START or STAY ON the medications listed below when I get home from the hospital:    aspirin 81 mg oral tablet, chewable  -- 1 tab(s) by mouth once a day  -- Indication: For Ppx    METFORMIN    TAB 1000MG  -- 1 unit(s) by mouth 2 times a day   -- Indication: For DM (diabetes mellitus)    Lantus 100 units/mL subcutaneous solution  -- 10 unit(s) subcutaneous once a day (at bedtime)  -- Indication: For DM (diabetes mellitus)    Prandin 0.5 mg oral tablet  -- 1 tab(s) by mouth 3 times a day (before meals)  -- Indication: For DM (diabetes mellitus)    atorvastatin 40 mg oral tablet  -- 1 tab(s) by mouth once a day (at bedtime)  -- Indication: For HLD    AMLOD/BENAZP CAP 5-10MG  -- Indication: For HTN (hypertension)    AMMONIUM LAC LOT 12%  -- Indication: For Skin    LATANOPROST  SOL 0.005%  -- Indication: For glaucoma I will START or STAY ON the medications listed below when I get home from the hospital:    aspirin 81 mg oral tablet, chewable  -- 1 tab(s) by mouth once a day  -- Indication: For Ppx    METFORMIN    TAB 1000MG  -- 1 unit(s) by mouth 2 times a day   -- Indication: For DM (diabetes mellitus)    Prandin 0.5 mg oral tablet  -- 1 tab(s) by mouth 3 times a day (before meals)  -- Indication: For DM (diabetes mellitus)    Lantus 100 units/mL subcutaneous solution  -- 10 unit(s) subcutaneous once a day (at bedtime) MDD:one month supply  -- Indication: For DM (diabetes mellitus)    atorvastatin 40 mg oral tablet  -- 1 tab(s) by mouth once a day (at bedtime)  -- Indication: For HLD    AMLOD/BENAZP CAP 5-10MG  -- Indication: For HTN (hypertension)    AMMONIUM LAC LOT 12%  -- Indication: For Skin    LATANOPROST  SOL 0.005%  -- Indication: For glaucoma

## 2018-04-21 NOTE — DISCHARGE NOTE ADULT - PLAN OF CARE
resolution and prevention of future episodes you had acute inflammation of your pancreas due to medications likely Januvia hence please don't take any more of that. The CT scan of abdomen and US of the abdomen did not show any hepatobiliary disease. Triglyceride level was not elevated. Please follow up with GI Dr Hardin who evaluated you in the hospital for close monitoring. Keep A1c<7 Your A1c was 11 hence we started lantus 10 units at bedtime and added prandin 0.5 tid to your metformin regimen. please continue medications as directed and follow up with endocrinology for repeat A1c in 3 months. Cr<1.2 Your renal functions were abnormal sec to pre-renal azotemia that improved significantly with iv fluids. last Cr level was 1.5. Please f/u with PMD for repeat BMP in 1 week since you are on metformin and benzapril.

## 2018-04-21 NOTE — DISCHARGE NOTE ADULT - PATIENT PORTAL LINK FT
You can access the [facility] Patient Portal, offered by [facility], by registering with the following website: [email or web address]

## 2018-04-21 NOTE — DISCHARGE NOTE ADULT - CARE PROVIDER_API CALL
Virgie Ji (PEDRO), EndocrinologyMetabDiabetes  [address] [place] [address]  Phone: [phone]  Fax: [phone]    Joel Hardin), Medicine  [address] [place] [address]  Phone: [phone]  Fax: [phone]

## 2020-02-07 ENCOUNTER — INPATIENT (INPATIENT)
Facility: HOSPITAL | Age: 75
LOS: 3 days | Discharge: HOME | DRG: 638 | End: 2020-02-11
Attending: INTERNAL MEDICINE | Admitting: INTERNAL MEDICINE
Payer: MEDICARE

## 2020-02-07 VITALS
HEIGHT: 66 IN | WEIGHT: 139.99 LBS | RESPIRATION RATE: 18 BRPM | OXYGEN SATURATION: 96 % | TEMPERATURE: 98 F | DIASTOLIC BLOOD PRESSURE: 85 MMHG | HEART RATE: 68 BPM | SYSTOLIC BLOOD PRESSURE: 196 MMHG

## 2020-02-07 DIAGNOSIS — I10 ESSENTIAL (PRIMARY) HYPERTENSION: ICD-10-CM

## 2020-02-07 LAB
ALBUMIN SERPL ELPH-MCNC: 3.4 G/DL — LOW (ref 3.5–5)
ALP SERPL-CCNC: 75 U/L — SIGNIFICANT CHANGE UP (ref 40–120)
ALT FLD-CCNC: 27 U/L DA — SIGNIFICANT CHANGE UP (ref 10–60)
ANION GAP SERPL CALC-SCNC: 9 MMOL/L — SIGNIFICANT CHANGE UP (ref 5–17)
AST SERPL-CCNC: 21 U/L — SIGNIFICANT CHANGE UP (ref 10–40)
BASE EXCESS BLDV CALC-SCNC: 2.5 MMOL/L — HIGH (ref -2–2)
BASOPHILS # BLD AUTO: 0.06 K/UL — SIGNIFICANT CHANGE UP (ref 0–0.2)
BASOPHILS NFR BLD AUTO: 0.8 % — SIGNIFICANT CHANGE UP (ref 0–2)
BILIRUB SERPL-MCNC: 0.7 MG/DL — SIGNIFICANT CHANGE UP (ref 0.2–1.2)
BUN SERPL-MCNC: 16 MG/DL — SIGNIFICANT CHANGE UP (ref 7–18)
CALCIUM SERPL-MCNC: 9 MG/DL — SIGNIFICANT CHANGE UP (ref 8.4–10.5)
CHLORIDE SERPL-SCNC: 96 MMOL/L — SIGNIFICANT CHANGE UP (ref 96–108)
CO2 SERPL-SCNC: 27 MMOL/L — SIGNIFICANT CHANGE UP (ref 22–31)
CREAT SERPL-MCNC: 1.58 MG/DL — HIGH (ref 0.5–1.3)
EOSINOPHIL # BLD AUTO: 0.8 K/UL — HIGH (ref 0–0.5)
EOSINOPHIL NFR BLD AUTO: 10.2 % — HIGH (ref 0–6)
GLUCOSE SERPL-MCNC: 468 MG/DL — CRITICAL HIGH (ref 70–99)
HCO3 BLDV-SCNC: 30 MMOL/L — HIGH (ref 21–29)
HCT VFR BLD CALC: 41.4 % — SIGNIFICANT CHANGE UP (ref 39–50)
HGB BLD-MCNC: 13.7 G/DL — SIGNIFICANT CHANGE UP (ref 13–17)
HOROWITZ INDEX BLDV+IHG-RTO: 21 — SIGNIFICANT CHANGE UP
IMM GRANULOCYTES NFR BLD AUTO: 0.4 % — SIGNIFICANT CHANGE UP (ref 0–1.5)
LYMPHOCYTES # BLD AUTO: 2.12 K/UL — SIGNIFICANT CHANGE UP (ref 1–3.3)
LYMPHOCYTES # BLD AUTO: 26.9 % — SIGNIFICANT CHANGE UP (ref 13–44)
MAGNESIUM SERPL-MCNC: 1.9 MG/DL — SIGNIFICANT CHANGE UP (ref 1.6–2.6)
MCHC RBC-ENTMCNC: 30.4 PG — SIGNIFICANT CHANGE UP (ref 27–34)
MCHC RBC-ENTMCNC: 33.1 GM/DL — SIGNIFICANT CHANGE UP (ref 32–36)
MCV RBC AUTO: 91.8 FL — SIGNIFICANT CHANGE UP (ref 80–100)
MONOCYTES # BLD AUTO: 0.68 K/UL — SIGNIFICANT CHANGE UP (ref 0–0.9)
MONOCYTES NFR BLD AUTO: 8.6 % — SIGNIFICANT CHANGE UP (ref 2–14)
NEUTROPHILS # BLD AUTO: 4.18 K/UL — SIGNIFICANT CHANGE UP (ref 1.8–7.4)
NEUTROPHILS NFR BLD AUTO: 53.1 % — SIGNIFICANT CHANGE UP (ref 43–77)
NRBC # BLD: 0 /100 WBCS — SIGNIFICANT CHANGE UP (ref 0–0)
NT-PROBNP SERPL-SCNC: 231 PG/ML — SIGNIFICANT CHANGE UP (ref 0–450)
PCO2 BLDV: 58 MMHG — HIGH (ref 35–50)
PH BLDV: 7.33 — LOW (ref 7.35–7.45)
PHOSPHATE SERPL-MCNC: 3.2 MG/DL — SIGNIFICANT CHANGE UP (ref 2.5–4.5)
PLATELET # BLD AUTO: 265 K/UL — SIGNIFICANT CHANGE UP (ref 150–400)
PO2 BLDV: 21 MMHG — LOW (ref 25–45)
POTASSIUM SERPL-MCNC: 4.3 MMOL/L — SIGNIFICANT CHANGE UP (ref 3.5–5.3)
POTASSIUM SERPL-SCNC: 4.3 MMOL/L — SIGNIFICANT CHANGE UP (ref 3.5–5.3)
PROT SERPL-MCNC: 7.9 G/DL — SIGNIFICANT CHANGE UP (ref 6–8.3)
RBC # BLD: 4.51 M/UL — SIGNIFICANT CHANGE UP (ref 4.2–5.8)
RBC # FLD: 14.5 % — SIGNIFICANT CHANGE UP (ref 10.3–14.5)
SAO2 % BLDV: 26 % — LOW (ref 67–88)
SODIUM SERPL-SCNC: 132 MMOL/L — LOW (ref 135–145)
TROPONIN I SERPL-MCNC: <0.015 NG/ML — SIGNIFICANT CHANGE UP (ref 0–0.04)
WBC # BLD: 7.87 K/UL — SIGNIFICANT CHANGE UP (ref 3.8–10.5)
WBC # FLD AUTO: 7.87 K/UL — SIGNIFICANT CHANGE UP (ref 3.8–10.5)

## 2020-02-07 PROCEDURE — 99285 EMERGENCY DEPT VISIT HI MDM: CPT

## 2020-02-07 PROCEDURE — 71045 X-RAY EXAM CHEST 1 VIEW: CPT | Mod: 26

## 2020-02-07 RX ORDER — METOPROLOL TARTRATE 50 MG
25 TABLET ORAL
Refills: 0 | Status: DISCONTINUED | OUTPATIENT
Start: 2020-02-07 | End: 2020-02-07

## 2020-02-07 RX ORDER — INSULIN LISPRO 100/ML
4 VIAL (ML) SUBCUTANEOUS ONCE
Refills: 0 | Status: COMPLETED | OUTPATIENT
Start: 2020-02-07 | End: 2020-02-07

## 2020-02-07 RX ORDER — ATORVASTATIN CALCIUM 80 MG/1
20 TABLET, FILM COATED ORAL AT BEDTIME
Refills: 0 | Status: DISCONTINUED | OUTPATIENT
Start: 2020-02-07 | End: 2020-02-07

## 2020-02-07 RX ORDER — PANTOPRAZOLE SODIUM 20 MG/1
40 TABLET, DELAYED RELEASE ORAL
Refills: 0 | Status: DISCONTINUED | OUTPATIENT
Start: 2020-02-07 | End: 2020-02-07

## 2020-02-07 RX ORDER — SODIUM CHLORIDE 9 MG/ML
1000 INJECTION INTRAMUSCULAR; INTRAVENOUS; SUBCUTANEOUS ONCE
Refills: 0 | Status: COMPLETED | OUTPATIENT
Start: 2020-02-07 | End: 2020-02-07

## 2020-02-07 RX ORDER — ACETAMINOPHEN 500 MG
650 TABLET ORAL EVERY 6 HOURS
Refills: 0 | Status: DISCONTINUED | OUTPATIENT
Start: 2020-02-07 | End: 2020-02-07

## 2020-02-07 RX ORDER — DIPHENHYDRAMINE HCL 50 MG
25 CAPSULE ORAL ONCE
Refills: 0 | Status: COMPLETED | OUTPATIENT
Start: 2020-02-07 | End: 2020-02-07

## 2020-02-07 RX ADMIN — SODIUM CHLORIDE 2000 MILLILITER(S): 9 INJECTION INTRAMUSCULAR; INTRAVENOUS; SUBCUTANEOUS at 20:06

## 2020-02-07 RX ADMIN — Medication 4 UNIT(S): at 21:09

## 2020-02-07 RX ADMIN — Medication 25 MILLIGRAM(S): at 21:07

## 2020-02-07 NOTE — ED PROVIDER NOTE - ATTENDING CONTRIBUTION TO CARE
I was physically present for the E/M service provided. I agree with above history, physical, and plan which I have reviewed and edited where appropriate. I was physically present for the key portions of the service provided.    Alix: 74M poor historian with unclear medical history who was BIBEMS for generalized itching of 5 days duration. no cp, no headache, no swelling, no abd pain.    dry skin circular rash without erythema or versicles  neurologically intact, aox4, non-ataxic gait    a/p: itch r/o renal failure vs lytes imbalance. labs,

## 2020-02-07 NOTE — H&P ADULT - ASSESSMENT
74M from home with PMH of DM, HTN BIBEMS for generalized itching of 5 days duration. He has never experienced similar symptoms. It has been constant over the past week to the point that he decided to come in. He denies any variation in itching during day and night and mentions that it started all over the body all together. Denies taking any new medication, use any new soap, or bedsheets. Denies any infectious symptoms. No one with similar symptoms in the family. No pain. No fever, chills, n/v/d, abdominal pain.    He lives with 2 sons and daughter in laws, Reports following with PCP 1.5 months back but does not remember the name of the PCP. Unable to recall name of home meds or name of pharmacy. Primary team to follow up on reconciliation of home meds.      He presented with FS: 511, SBP: 180s. Pt reports taking PO meds, denies insulin use, mentions compliance with PO meds.    Admitted with uncontrolled sugars.

## 2020-02-07 NOTE — ED PROVIDER NOTE - OBJECTIVE STATEMENT
74M poor historian with unclear medical history who was BIBEMS for generalized itching of 5 days duration. He has never experienced similar symptoms. It has been constant over the past week to the point that he decided to come in. Denies any infectious symptoms. No pain. No fever, chills, n/v/d, abdominal pain.

## 2020-02-07 NOTE — ED PROVIDER NOTE - PROGRESS NOTE DETAILS
Ordering labs, IVF, Benadryl, EKG, CXR valle: pt with uncontrolled HTn and DM.  has no proper follow up and pt not taking meds  admit to med for uncontrolled HTN and DM

## 2020-02-07 NOTE — H&P ADULT - HISTORY OF PRESENT ILLNESS
74M poor historian with unclear medical history who was BIBEMS for generalized itching of 5 days duration. He has never experienced similar symptoms. It has been constant over the past week to the point that he decided to come in. Denies any infectious symptoms. No pain. No fever, chills, n/v/d, abdominal pain. 74M from home with PMH of DM, HTN BIBEMS for generalized itching of 5 days duration. He has never experienced similar symptoms. It has been constant over the past week to the point that he decided to come in. He denies any variation in itching during day and night and mentions that it started all over the body all together. Denies taking any new medication, use any new soap, or bedsheets. Denies any infectious symptoms. No one with similar symptoms in the family. No pain. No fever, chills, n/v/d, abdominal pain.    He lives with 2 sons and daughter in laws, Reports following with PCP 1.5 months back but does not remember the name of the PCP. Unable to recall name of home meds or name of pharmacy. Primary team to follow up on reconciliation of home meds.      He presented with FS: 511, SBP: 180s. Pt reports taking PO meds, denies insulin use, mentions compliance with PO meds. 74M from home with PMH of DM, HTN BIBEMS for generalized itching of 5 days duration. He has never experienced similar symptoms. It has been constant over the past week to the point that he decided to come in. He denies any variation in itching during day and night and mentions that it started all over the body all together. Denies taking any new medication, use any new soap, or bedsheets. Denies any infectious symptoms. No one with similar symptoms in the family. No pain. No fever, chills, n/v/d, abdominal pain.    He lives with 2 sons and daughter in laws, Reports following with PCP 1.5 months back but does not remember the name of the PCP. Unable to recall name of home meds or name of pharmacy. Primary team to follow up on reconciliation of home meds.      He presented with FS: 511, SBP: 180s. Pt reports taking PO meds, denies insulin use, mentions compliance with PO meds.     Son phone number: [phone]

## 2020-02-07 NOTE — ED ADULT NURSE NOTE - NS PRO PASSIVE SMOKE EXP
Attending MD Haque:  I personally have seen and examined this patient.  Resident note reviewed and agree on plan of care and except where noted. No

## 2020-02-07 NOTE — H&P ADULT - PROBLEM SELECTOR PLAN 5
IMPROVE VTE Individual Risk Assessment  RISK                                                                Points  [  ] Previous VTE                                                  3  [  ] Thrombophilia                                               2  [  ] Lower limb paralysis                                      2        (unable to hold up >15 seconds)    [  ] Current Cancer                                              2         (within 6 months)  [x  ] Immobilization > 24 hrs                                1  [  ] ICU/CCU stay > 24 hours                              1  [x  ] Age > 60                                                      1  IMPROVE VTE Score _________2, heparin  for DVT proph

## 2020-02-07 NOTE — H&P ADULT - NSHPPHYSICALEXAM_GEN_ALL_CORE
Vital Signs Last 24 Hrs  T(C): 37 (07 Feb 2020 21:27), Max: 37 (07 Feb 2020 21:27)  T(F): 98.6 (07 Feb 2020 21:27), Max: 98.6 (07 Feb 2020 21:27)  HR: 76 (07 Feb 2020 21:27) (68 - 76)  BP: 180/75 (07 Feb 2020 21:27) (180/75 - 196/85)  RR: 18 (07 Feb 2020 21:27) (18 - 18)  SpO2: 96% (07 Feb 2020 21:27) (96% - 96%)    PHYSICAL EXAM:  GENERAL: NAD  HEENT: Normocephalic;  conjunctivae and sclerae clear; moist mucous membranes;   NECK : supple  CHEST/LUNG: Clear to auscultation bilaterally with good air entry   HEART: S1 S2  regular; no murmurs, gallops or rubs  ABDOMEN: Soft, Nontender, Nondistended; Bowel sounds present  EXTREMITIES: no cyanosis; no edema; no calf tenderness  SKIN: warm and dry; no rash  NERVOUS SYSTEM:  Awake and alert; Oriented  to place, person and time ; no new deficits Vital Signs Last 24 Hrs  T(C): 37 (07 Feb 2020 21:27), Max: 37 (07 Feb 2020 21:27)  T(F): 98.6 (07 Feb 2020 21:27), Max: 98.6 (07 Feb 2020 21:27)  HR: 76 (07 Feb 2020 21:27) (68 - 76)  BP: 180/75 (07 Feb 2020 21:27) (180/75 - 196/85)  RR: 18 (07 Feb 2020 21:27) (18 - 18)  SpO2: 96% (07 Feb 2020 21:27) (96% - 96%)    PHYSICAL EXAM:  GENERAL: male in bed, itching uncomfortably   HEENT: Normocephalic;  conjunctivae and sclerae clear; moist mucous membranes;   NECK : supple  CHEST/LUNG: Clear to auscultation bilaterally with good air entry   HEART: S1 S2  regular; no murmurs, gallops or rubs  ABDOMEN: Soft, Nontender, Nondistended; Bowel sounds present  EXTREMITIES: no cyanosis; no edema; no calf tenderness  SKIN: excoriation present over b/l UE, Back , lower abdomen   NERVOUS SYSTEM:  Awake and alert; Oriented  to place, person and time ; no new deficits

## 2020-02-07 NOTE — H&P ADULT - PROBLEM SELECTOR PLAN 4
p/w BP: 180s.   s/p IV labetelol 10 mg Once   - c/w : amlodipine, Losartan  Monitor BP   -Primary team to confirm home meds.

## 2020-02-07 NOTE — H&P ADULT - PROBLEM SELECTOR PLAN 3
- p/w rash with itching, unlikely scabies   - Will give PO Prednisone 40 mg   - Loratadine   -Iv benadryl PRN   -Calamine lotion - p/w rash with itching, unlikely scabies   - Will give PO Prednisone 40 mg   - Loratadine   -Iv benadryl PRN   -Calamine lotion  Dr Rangel consulted

## 2020-02-07 NOTE — H&P ADULT - PROBLEM SELECTOR PLAN 1
p/w Fingerstick: 500s. Ed course; 1L NS with Humalog 7 units   -Started on Lantus 12 units with 3 pre meal with SS coverage   - [id number] p/w Fingerstick: 500s. Ed course; 1L NS with Humalog 7 units   -Started on Lantus 12 units with 3 pre meal with SS coverage   - [id number]  Dr Ji consulted

## 2020-02-07 NOTE — ED PROVIDER NOTE - CLINICAL SUMMARY MEDICAL DECISION MAKING FREE TEXT BOX
Patient unclear medical history suspect itching from uremia. Ordering labs, EKG, CXR. IVF for hyperglycemia along with insulin

## 2020-02-07 NOTE — ED PROVIDER NOTE - MDM ORDERS SUBMITTED SELECTION
Dear Kb Cornejo,,    I had the pleasure of seeing our mutual patient Justice Irizarry today and my office note is below for your records. Please do not hesitate to contact me if you have any questions or concerns about the note below. Thank you for the opportunity to share in the care of this patient. Assessment   1. CARLOS on CPAP (G47.33,Z99.89)   Â· -compliant nightly. -CPM.   2. Benign essential HTN (I10)   Â· -well controlled. -CPM.   3. Dyslipidemia (E78.5)   Â· goal LDL < 70 mg/dl for secondary prevention. Â· -Since patient stopped atorvastatin, total cholesterol increased to 214 with LDL of 141. We discussed the importance of compliance and he will resume atorvastatin 10 mg p.o.      daily. With that dose lipids were excellent in the past.   4. Atherosclerotic vascular disease (I70.90)   Â· Coronary Ca score 66 (mild disease), carotid Duplex scan shows intima-media      thickening without critical stenoses. Â· -continue strict control of CV risk factors. -ischemic work up as clinically indicated. -continue aspirin 81 mg PO QG   5. Obesity (BMI 30-39.9) (E66.9)   Â· -encouraged to increase daily exercise routine and decrease ccal intake. Recently lost 7      lbs! Orders  Atherosclerotic vascular disease, Benign essential HTN, Dyslipidemia, Obesity (BMI  30-39.9), CARLOS on CPAP    Â· Follow-up visit in 6 months Follow Up  Follow-up  Status: Active  Requested for:  [id number]   Ordered; For: Atherosclerotic vascular disease, Benign essential HTN, Dyslipidemia, Obesity (BMI 30-39.9), CARLOS on CPAP; Ordered HELEN Warner Performed:  Due: [id number]    Reason For Visit    Patient presents for follow up . HX: HTN,Cerebral microvascular disease/ pt c/o on palpitation and SOB,tiredness. Accompanied By: alone. :  services not used.        Referred By: PCP: Andrea Escamilla MD  [address] [place] [address]#[phone]  [phone]      History of Present Illness    Patient returns to the office today for a routine 6 months follow-up visit. He complains of stable exertional dyspnea, otherwise is doing well. Blood pressure has been controlled although antihypertensive regimen was changed. Labetalol was discontinued, he now takes Benicar 20 mg p.o. daily and spironolactone 25 mg p.o. daily. Most recent blood work showed worsening dyslipidemia with total cholesterol of 214 mg/dl and  mg/dl. Apparently patient discontinued atorvastatin 10 mg Po QD few months ago. Of note, while he was taking it, his total cholesterol was 121 with LDL of 61. Has history of CARLOS, he has been compliant with CPAP. Active Problems   1. Benign essential HTN (I10)   2. Dyslipidemia (E78.5)   3. Obesity (BMI 30-39.9) (E66.9)   4. Atherosclerotic vascular disease (I70.90)   5. Cerebral microvascular disease (I67.9)   6. CARLOS on CPAP (G47.33,Z99.89)    Past Medical History   1. History of BPH (benign prostatic hypertrophy) with urinary retention (N40.1,R33.8)   2. History of nocturia (Z87.898)   3. History of thyroid nodule (Z86.39)   4. History of urinary frequency (U58.786)    Surgical History   1. History of Appendectomy   2. History of Cholecystectomy   3. History of Sinus Surgery    Family History   1. Family history of COPD, mild : Father   2. Family history of  : Mother, Father   3. Family history of arthritis (Z82.61) : Sister   4. Family history of heart disease (Z82.49) : Father, Cousin   5. Family history of malignant neoplasm of breast (Z80.3) : Sister    Social History   Â· Activities: Swimming   Â· Drinks coffee   Â· Engages in hobby   Â· Exercises occasionally (Z78.9)   Â· Language difficulty (F80.9)   Â· Lives with wife   Â·    Â· Minimum alcohol consumption   Â· No guns in home   Â· No illicit drug use   Â· Non-smoker (Z78.9)   Â· One child   Â· Retired    Allergies   1. No Known Drug Allergies    Presenting Meds   1.  DULoxetine HCl - 30 MG Oral Capsule Delayed Release Particles; TAKE 1 CAPSULE   BEDTIME; Therapy: [id number] to (Last Rx:12Sep2018) Ordered   2. Donepezil HCl - 10 MG Oral Tablet; TAKE 1 TABLET DAILY; Therapy: [id number] to 0480 66 01 75); Last Rx:28Mar2018 Ordered   3. Fish Oil CAPS; 1tab daily; Therapy: (Jovan Hamilton) to Recorded   4. ALPRAZolam 0.5 MG Oral Tablet; 1 tab daily; Therapy: (Talib Longo) to Recorded   5. Aspirin 81 MG TABS; 1 daily; Therapy: (Talib Longo) to Recorded   6. Benicar 20 MG Oral Tablet; TAKE 1 TABLET DAILY; Therapy: (Recorded:05Nov2018) to Recorded   7. CoQ-10 100 MG Oral Capsule Extended Release; as directed; Therapy: (Talib Longo) to Recorded   8. LORazepam 0.5 MG Oral Tablet; TAKE 1 TABLET BY MOUTH THREE TIMES DAILY; Therapy: (Recorded:05Nov2018) to Recorded   9. Omega 3 500 500 MG Oral Capsule; 1 tab daily; Therapy: (Talib Longo) to Recorded   10. Spironolactone 25 MG Oral Tablet; TAKE 1 TABLET  daily; Therapy: (Recorded:05Nov2018) to Recorded   11. Terazosin HCl - 10 MG Oral Capsule; TAKE 1 CAPSULE AT BEDTIME NIGHTLY; Therapy: (Talib Longo) to Recorded   12. Vitamin C ER 1000 MG Oral Tablet Extended Release; TAKE 1 TABLET DAILY; Therapy: (Talib Longo) to Recorded   13. Vitamin D3 5000 UNIT Oral Tablet; TAKE 1 TABLET DAILY; Therapy: (Jovan Hamilton) to Recorded    Physical Exam    General Appearance   Not in acute distress. obese. Head   No trauma, normocephalic. Neck   No elevated JVP. Eyes   Conjunctivae not injected, no xanthelasma. Ears, Nose, Throat:   Mucosa pink and moist.    Lungs   Full expansion and clear to auscultation    Cardiovascular   Palpation of heart: PMI not displaced, no lifts, thrills or rub. Auscultation of heart: Regular rhythm, normal S1,S2 without S3. No pathological murmurs. Carotid pulses: Normal without bruits.     Femoral pulses: Normal.    Pedal pulses: Normal.    Examination of extremities for edema and/or varicosities: No peripheral edema. Abdomen   No masses, tenderness or hepatosplenomegaly. Abdominal aorta: Not enlarged. Rectal Exam: Deferred   Musculoskeletal   Normal gait, normal muscle tone. Neurologic   Oriented to person, place and time. Normal affect. Skin   No rashes, lesions or ulcers. Nails   Normal without clubbing or cyanosis.        Signatures   Electronically signed by : Alcira Campo MD; Nov 5 2018  4:45PM CST (Author) Imaging Studies/Labs/Medications/EKG

## 2020-02-07 NOTE — ED ADULT TRIAGE NOTE - TEMPERATURE IN FAHRENHEIT (DEGREES F)
Exercise Program:    [x] (41463) Reviewed/Progressed HEP activities related to strengthening, flexibility, endurance, ROM of scapular, scapulothoracic and UE control with self care, reaching, carrying, lifting, house/yardwork, driving/computer work  [] (91009) Reviewed/Progressed HEP activities related to improving balance, coordination, kinesthetic sense, posture, motor skill, proprioception of scapular, scapulothoracic and UE control with self care, reaching, carrying, lifting, house/yardwork, driving/computer work      Manual Treatments:  PROM / STM / Oscillations-Mobs:  G-I, II, III, IV (PA's, Inf., Post.)  [x] (94884) Provided manual therapy to mobilize soft tissue/joints of cervical/CT, scapular GHJ and UE for the purpose of modulating pain, promoting relaxation,  increasing ROM, reducing/eliminating soft tissue swelling/inflammation/restriction, improving soft tissue extensibility and allowing for proper ROM for normal function with self care, reaching, carrying, lifting, house/yardwork, driving/computer work    Modalities:    /  CP L shoulder x15' Pt declined estim    Charges:  Timed Code Treatment Minutes: 35   Total Treatment Minutes: 50     [] EVAL (LOW) 88692 (typically 20 minutes face-to-face)  [] EVAL (MOD) 68110 (typically 30 minutes face-to-face)  [] EVAL (HIGH) 64037 (typically 45 minutes face-to-face)  [] RE-EVAL     [] [place](09370) x      [] IONTO  [] NMR (13179) x      [] VASO   [x] Manual (02404) x  2    [] Other:  [] TA x       [] Mech Traction (22159)  [] ES(attended) (19736)      [] ES (un) (27599):     Zachary Lopez stated goal: able to reach in to overhead cabinets at home    Therapist goals for Patient:    Short Term Goals: To be achieved in: 2 weeks  1. Independent in HEP and progression per patient tolerance, in order to prevent re-injury. 2. Patient will have a decrease in pain to facilitate improvement in movement, function, and ADLs as indicated by Functional Deficits.     Long Term
98.4

## 2020-02-08 DIAGNOSIS — Z29.9 ENCOUNTER FOR PROPHYLACTIC MEASURES, UNSPECIFIED: ICD-10-CM

## 2020-02-08 DIAGNOSIS — E11.65 TYPE 2 DIABETES MELLITUS WITH HYPERGLYCEMIA: ICD-10-CM

## 2020-02-08 DIAGNOSIS — R21 RASH AND OTHER NONSPECIFIC SKIN ERUPTION: ICD-10-CM

## 2020-02-08 DIAGNOSIS — I10 ESSENTIAL (PRIMARY) HYPERTENSION: ICD-10-CM

## 2020-02-08 DIAGNOSIS — N17.9 ACUTE KIDNEY FAILURE, UNSPECIFIED: ICD-10-CM

## 2020-02-08 LAB
24R-OH-CALCIDIOL SERPL-MCNC: 33.7 NG/ML — SIGNIFICANT CHANGE UP (ref 30–80)
ALBUMIN SERPL ELPH-MCNC: 3.3 G/DL — LOW (ref 3.5–5)
ALP SERPL-CCNC: 72 U/L — SIGNIFICANT CHANGE UP (ref 40–120)
ALT FLD-CCNC: 27 U/L DA — SIGNIFICANT CHANGE UP (ref 10–60)
ANION GAP SERPL CALC-SCNC: 4 MMOL/L — LOW (ref 5–17)
APPEARANCE UR: CLEAR — SIGNIFICANT CHANGE UP
AST SERPL-CCNC: 25 U/L — SIGNIFICANT CHANGE UP (ref 10–40)
B-OH-BUTYR SERPL-SCNC: 0.2 MMOL/L — SIGNIFICANT CHANGE UP
BACTERIA # UR AUTO: ABNORMAL /HPF
BASOPHILS # BLD AUTO: 0.04 K/UL — SIGNIFICANT CHANGE UP (ref 0–0.2)
BASOPHILS NFR BLD AUTO: 0.5 % — SIGNIFICANT CHANGE UP (ref 0–2)
BILIRUB SERPL-MCNC: 0.9 MG/DL — SIGNIFICANT CHANGE UP (ref 0.2–1.2)
BILIRUB UR-MCNC: NEGATIVE — SIGNIFICANT CHANGE UP
BUN SERPL-MCNC: 13 MG/DL — SIGNIFICANT CHANGE UP (ref 7–18)
CALCIUM SERPL-MCNC: 8.9 MG/DL — SIGNIFICANT CHANGE UP (ref 8.4–10.5)
CHLORIDE SERPL-SCNC: 106 MMOL/L — SIGNIFICANT CHANGE UP (ref 96–108)
CHOLEST SERPL-MCNC: 139 MG/DL — SIGNIFICANT CHANGE UP (ref 10–199)
CO2 SERPL-SCNC: 29 MMOL/L — SIGNIFICANT CHANGE UP (ref 22–31)
COLOR SPEC: YELLOW — SIGNIFICANT CHANGE UP
COMMENT - URINE: SIGNIFICANT CHANGE UP
CREAT ?TM UR-MCNC: 42 MG/DL — SIGNIFICANT CHANGE UP
CREAT SERPL-MCNC: 1.44 MG/DL — HIGH (ref 0.5–1.3)
CRP SERPL-MCNC: 0.13 MG/DL — SIGNIFICANT CHANGE UP (ref 0–0.4)
DIFF PNL FLD: NEGATIVE — SIGNIFICANT CHANGE UP
EOSINOPHIL # BLD AUTO: 0.83 K/UL — HIGH (ref 0–0.5)
EOSINOPHIL NFR BLD AUTO: 10.3 % — HIGH (ref 0–6)
EPI CELLS # UR: SIGNIFICANT CHANGE UP /HPF
ERYTHROCYTE [SEDIMENTATION RATE] IN BLOOD: 11 MM/HR — SIGNIFICANT CHANGE UP (ref 0–20)
FOLATE SERPL-MCNC: 19 NG/ML — SIGNIFICANT CHANGE UP
GLUCOSE BLDC GLUCOMTR-MCNC: 256 MG/DL — HIGH (ref 70–99)
GLUCOSE BLDC GLUCOMTR-MCNC: 291 MG/DL — HIGH (ref 70–99)
GLUCOSE BLDC GLUCOMTR-MCNC: 292 MG/DL — HIGH (ref 70–99)
GLUCOSE BLDC GLUCOMTR-MCNC: 297 MG/DL — HIGH (ref 70–99)
GLUCOSE BLDC GLUCOMTR-MCNC: 377 MG/DL — HIGH (ref 70–99)
GLUCOSE BLDC GLUCOMTR-MCNC: 479 MG/DL — CRITICAL HIGH (ref 70–99)
GLUCOSE BLDC GLUCOMTR-MCNC: 67 MG/DL — LOW (ref 70–99)
GLUCOSE SERPL-MCNC: 254 MG/DL — HIGH (ref 70–99)
GLUCOSE UR QL: 1000 MG/DL
HCT VFR BLD CALC: 42.1 % — SIGNIFICANT CHANGE UP (ref 39–50)
HDLC SERPL-MCNC: 56 MG/DL — SIGNIFICANT CHANGE UP
HGB BLD-MCNC: 13.7 G/DL — SIGNIFICANT CHANGE UP (ref 13–17)
HYALINE CASTS # UR AUTO: ABNORMAL /LPF
IMM GRANULOCYTES NFR BLD AUTO: 0.2 % — SIGNIFICANT CHANGE UP (ref 0–1.5)
KETONES UR-MCNC: NEGATIVE — SIGNIFICANT CHANGE UP
LEUKOCYTE ESTERASE UR-ACNC: NEGATIVE — SIGNIFICANT CHANGE UP
LIPID PNL WITH DIRECT LDL SERPL: 67 MG/DL — SIGNIFICANT CHANGE UP
LYMPHOCYTES # BLD AUTO: 1.83 K/UL — SIGNIFICANT CHANGE UP (ref 1–3.3)
LYMPHOCYTES # BLD AUTO: 22.6 % — SIGNIFICANT CHANGE UP (ref 13–44)
MAGNESIUM SERPL-MCNC: 2.1 MG/DL — SIGNIFICANT CHANGE UP (ref 1.6–2.6)
MCHC RBC-ENTMCNC: 30.2 PG — SIGNIFICANT CHANGE UP (ref 27–34)
MCHC RBC-ENTMCNC: 32.5 GM/DL — SIGNIFICANT CHANGE UP (ref 32–36)
MCV RBC AUTO: 92.7 FL — SIGNIFICANT CHANGE UP (ref 80–100)
MONOCYTES # BLD AUTO: 0.62 K/UL — SIGNIFICANT CHANGE UP (ref 0–0.9)
MONOCYTES NFR BLD AUTO: 7.7 % — SIGNIFICANT CHANGE UP (ref 2–14)
NEUTROPHILS # BLD AUTO: 4.74 K/UL — SIGNIFICANT CHANGE UP (ref 1.8–7.4)
NEUTROPHILS NFR BLD AUTO: 58.7 % — SIGNIFICANT CHANGE UP (ref 43–77)
NITRITE UR-MCNC: NEGATIVE — SIGNIFICANT CHANGE UP
NRBC # BLD: 0 /100 WBCS — SIGNIFICANT CHANGE UP (ref 0–0)
OSMOLALITY UR: 355 MOS/KG — SIGNIFICANT CHANGE UP (ref 50–1200)
PH UR: 5 — SIGNIFICANT CHANGE UP (ref 5–8)
PHOSPHATE SERPL-MCNC: 3.3 MG/DL — SIGNIFICANT CHANGE UP (ref 2.5–4.5)
PLATELET # BLD AUTO: 258 K/UL — SIGNIFICANT CHANGE UP (ref 150–400)
POTASSIUM SERPL-MCNC: 4.2 MMOL/L — SIGNIFICANT CHANGE UP (ref 3.5–5.3)
POTASSIUM SERPL-SCNC: 4.2 MMOL/L — SIGNIFICANT CHANGE UP (ref 3.5–5.3)
PROT SERPL-MCNC: 7.6 G/DL — SIGNIFICANT CHANGE UP (ref 6–8.3)
PROT UR-MCNC: 100
RBC # BLD: 4.54 M/UL — SIGNIFICANT CHANGE UP (ref 4.2–5.8)
RBC # FLD: 14.6 % — HIGH (ref 10.3–14.5)
RBC CASTS # UR COMP ASSIST: SIGNIFICANT CHANGE UP /HPF (ref 0–2)
SODIUM SERPL-SCNC: 139 MMOL/L — SIGNIFICANT CHANGE UP (ref 135–145)
SODIUM UR-SCNC: 80 MMOL/L — SIGNIFICANT CHANGE UP
SP GR SPEC: 1.01 — SIGNIFICANT CHANGE UP (ref 1.01–1.02)
TOTAL CHOLESTEROL/HDL RATIO MEASUREMENT: 2.5 RATIO — LOW (ref 3.4–9.6)
TRIGL SERPL-MCNC: 81 MG/DL — SIGNIFICANT CHANGE UP (ref 10–149)
TROPONIN I SERPL-MCNC: <0.015 NG/ML — SIGNIFICANT CHANGE UP (ref 0–0.04)
TSH SERPL-MCNC: 2.26 UU/ML — SIGNIFICANT CHANGE UP (ref 0.34–4.82)
UROBILINOGEN FLD QL: NEGATIVE — SIGNIFICANT CHANGE UP
UUN UR-MCNC: 210 MG/DL — SIGNIFICANT CHANGE UP
VIT B12 SERPL-MCNC: 936 PG/ML — SIGNIFICANT CHANGE UP (ref 232–1245)
WBC # BLD: 8.08 K/UL — SIGNIFICANT CHANGE UP (ref 3.8–10.5)
WBC # FLD AUTO: 8.08 K/UL — SIGNIFICANT CHANGE UP (ref 3.8–10.5)
WBC UR QL: SIGNIFICANT CHANGE UP /HPF (ref 0–5)

## 2020-02-08 RX ORDER — LOSARTAN POTASSIUM 100 MG/1
25 TABLET, FILM COATED ORAL DAILY
Refills: 0 | Status: DISCONTINUED | OUTPATIENT
Start: 2020-02-08 | End: 2020-02-11

## 2020-02-08 RX ORDER — ATORVASTATIN CALCIUM 80 MG/1
40 TABLET, FILM COATED ORAL AT BEDTIME
Refills: 0 | Status: DISCONTINUED | OUTPATIENT
Start: 2020-02-08 | End: 2020-02-11

## 2020-02-08 RX ORDER — FAMOTIDINE 10 MG/ML
20 INJECTION INTRAVENOUS
Refills: 0 | Status: DISCONTINUED | OUTPATIENT
Start: 2020-02-08 | End: 2020-02-11

## 2020-02-08 RX ORDER — LABETALOL HCL 100 MG
10 TABLET ORAL ONCE
Refills: 0 | Status: COMPLETED | OUTPATIENT
Start: 2020-02-08 | End: 2020-02-08

## 2020-02-08 RX ORDER — HEPARIN SODIUM 5000 [USP'U]/ML
5000 INJECTION INTRAVENOUS; SUBCUTANEOUS EVERY 12 HOURS
Refills: 0 | Status: DISCONTINUED | OUTPATIENT
Start: 2020-02-08 | End: 2020-02-11

## 2020-02-08 RX ORDER — ACETAMINOPHEN 500 MG
650 TABLET ORAL EVERY 6 HOURS
Refills: 0 | Status: DISCONTINUED | OUTPATIENT
Start: 2020-02-08 | End: 2020-02-11

## 2020-02-08 RX ORDER — ASPIRIN/CALCIUM CARB/MAGNESIUM 324 MG
81 TABLET ORAL DAILY
Refills: 0 | Status: DISCONTINUED | OUTPATIENT
Start: 2020-02-08 | End: 2020-02-11

## 2020-02-08 RX ORDER — INSULIN LISPRO 100/ML
VIAL (ML) SUBCUTANEOUS
Refills: 0 | Status: DISCONTINUED | OUTPATIENT
Start: 2020-02-08 | End: 2020-02-10

## 2020-02-08 RX ORDER — INSULIN GLARGINE 100 [IU]/ML
20 INJECTION, SOLUTION SUBCUTANEOUS AT BEDTIME
Refills: 0 | Status: DISCONTINUED | OUTPATIENT
Start: 2020-02-08 | End: 2020-02-10

## 2020-02-08 RX ORDER — INSULIN GLARGINE 100 [IU]/ML
12 INJECTION, SOLUTION SUBCUTANEOUS ONCE
Refills: 0 | Status: COMPLETED | OUTPATIENT
Start: 2020-02-08 | End: 2020-02-08

## 2020-02-08 RX ORDER — INSULIN LISPRO 100/ML
6 VIAL (ML) SUBCUTANEOUS
Refills: 0 | Status: DISCONTINUED | OUTPATIENT
Start: 2020-02-08 | End: 2020-02-10

## 2020-02-08 RX ORDER — INSULIN LISPRO 100/ML
3 VIAL (ML) SUBCUTANEOUS
Refills: 0 | Status: DISCONTINUED | OUTPATIENT
Start: 2020-02-08 | End: 2020-02-08

## 2020-02-08 RX ORDER — INSULIN LISPRO 100/ML
3 VIAL (ML) SUBCUTANEOUS ONCE
Refills: 0 | Status: COMPLETED | OUTPATIENT
Start: 2020-02-08 | End: 2020-02-08

## 2020-02-08 RX ORDER — SODIUM CHLORIDE 9 MG/ML
1000 INJECTION INTRAMUSCULAR; INTRAVENOUS; SUBCUTANEOUS
Refills: 0 | Status: DISCONTINUED | OUTPATIENT
Start: 2020-02-08 | End: 2020-02-10

## 2020-02-08 RX ORDER — DIPHENHYDRAMINE HCL 50 MG
25 CAPSULE ORAL EVERY 6 HOURS
Refills: 0 | Status: DISCONTINUED | OUTPATIENT
Start: 2020-02-08 | End: 2020-02-11

## 2020-02-08 RX ORDER — LORATADINE 10 MG/1
10 TABLET ORAL DAILY
Refills: 0 | Status: DISCONTINUED | OUTPATIENT
Start: 2020-02-08 | End: 2020-02-11

## 2020-02-08 RX ORDER — INSULIN GLARGINE 100 [IU]/ML
12 INJECTION, SOLUTION SUBCUTANEOUS AT BEDTIME
Refills: 0 | Status: DISCONTINUED | OUTPATIENT
Start: 2020-02-08 | End: 2020-02-08

## 2020-02-08 RX ORDER — CALAMINE AND ZINC OXIDE AND PHENOL 160; 10 MG/ML; MG/ML
1 LOTION TOPICAL EVERY 6 HOURS
Refills: 0 | Status: DISCONTINUED | OUTPATIENT
Start: 2020-02-08 | End: 2020-02-11

## 2020-02-08 RX ORDER — AMLODIPINE BESYLATE 2.5 MG/1
5 TABLET ORAL DAILY
Refills: 0 | Status: DISCONTINUED | OUTPATIENT
Start: 2020-02-08 | End: 2020-02-11

## 2020-02-08 RX ADMIN — AMLODIPINE BESYLATE 5 MILLIGRAM(S): 2.5 TABLET ORAL at 06:05

## 2020-02-08 RX ADMIN — LOSARTAN POTASSIUM 25 MILLIGRAM(S): 100 TABLET, FILM COATED ORAL at 06:05

## 2020-02-08 RX ADMIN — CALAMINE AND ZINC OXIDE AND PHENOL 1 APPLICATION(S): 160; 10 LOTION TOPICAL at 17:04

## 2020-02-08 RX ADMIN — Medication 40 MILLIGRAM(S): at 06:06

## 2020-02-08 RX ADMIN — Medication 1 APPLICATION(S): at 17:57

## 2020-02-08 RX ADMIN — LORATADINE 10 MILLIGRAM(S): 10 TABLET ORAL at 06:05

## 2020-02-08 RX ADMIN — CALAMINE AND ZINC OXIDE AND PHENOL 1 APPLICATION(S): 160; 10 LOTION TOPICAL at 00:47

## 2020-02-08 RX ADMIN — FAMOTIDINE 20 MILLIGRAM(S): 10 INJECTION INTRAVENOUS at 06:05

## 2020-02-08 RX ADMIN — Medication 81 MILLIGRAM(S): at 17:03

## 2020-02-08 RX ADMIN — SODIUM CHLORIDE 80 MILLILITER(S): 9 INJECTION INTRAMUSCULAR; INTRAVENOUS; SUBCUTANEOUS at 09:22

## 2020-02-08 RX ADMIN — Medication 3: at 07:28

## 2020-02-08 RX ADMIN — SODIUM CHLORIDE 1000 MILLILITER(S): 9 INJECTION INTRAMUSCULAR; INTRAVENOUS; SUBCUTANEOUS at 01:42

## 2020-02-08 RX ADMIN — HEPARIN SODIUM 5000 UNIT(S): 5000 INJECTION INTRAVENOUS; SUBCUTANEOUS at 17:03

## 2020-02-08 RX ADMIN — Medication 10 MILLIGRAM(S): at 01:32

## 2020-02-08 RX ADMIN — CALAMINE AND ZINC OXIDE AND PHENOL 1 APPLICATION(S): 160; 10 LOTION TOPICAL at 07:41

## 2020-02-08 RX ADMIN — Medication 5: at 12:31

## 2020-02-08 RX ADMIN — Medication 3 UNIT(S): at 09:22

## 2020-02-08 RX ADMIN — Medication 3 UNIT(S): at 00:46

## 2020-02-08 RX ADMIN — INSULIN GLARGINE 12 UNIT(S): 100 INJECTION, SOLUTION SUBCUTANEOUS at 00:46

## 2020-02-08 RX ADMIN — SODIUM CHLORIDE 80 MILLILITER(S): 9 INJECTION INTRAMUSCULAR; INTRAVENOUS; SUBCUTANEOUS at 07:00

## 2020-02-08 RX ADMIN — FAMOTIDINE 20 MILLIGRAM(S): 10 INJECTION INTRAVENOUS at 17:03

## 2020-02-08 RX ADMIN — ATORVASTATIN CALCIUM 40 MILLIGRAM(S): 80 TABLET, FILM COATED ORAL at 23:00

## 2020-02-08 RX ADMIN — Medication 6 UNIT(S): at 17:04

## 2020-02-08 RX ADMIN — Medication 6: at 17:04

## 2020-02-08 RX ADMIN — INSULIN GLARGINE 20 UNIT(S): 100 INJECTION, SOLUTION SUBCUTANEOUS at 22:51

## 2020-02-08 RX ADMIN — Medication 3 UNIT(S): at 12:31

## 2020-02-08 RX ADMIN — HEPARIN SODIUM 5000 UNIT(S): 5000 INJECTION INTRAVENOUS; SUBCUTANEOUS at 06:05

## 2020-02-08 NOTE — CONSULT NOTE ADULT - ASSESSMENT
74M from home with PMH of DM, HTN BIBEMS for generalized itching of 5 days duration.    Renal insufficiency   Baseline SCr unknown  ? pre-renal YANNICK vs. underlying CKD in the setting of longstanding DM  Scr improving on IVF  Advise to continue at present   UA with protein, no blood   Avoid NSAIDs    Hyponatremia  in the setting of elevated glucose  Now improved.   Optimize DM control    Proteinuria  Check spot urine TP/CR  Check urine culture  If TP/Cr elevated, will require further work up
74M from home with PMH of DM, HTN BIBEMS for generalized itching of 5 days duration. He presented with FS: 511. Pt reports taking PO meds, denies insulin use, mentions compliance with PO meds.
Rash - allergic    Plan - cont prednisone  reconsult prn

## 2020-02-08 NOTE — PATIENT PROFILE ADULT - NSPROPTRIGHTSUPPORTPERSON_GEN_A_NUR
Render Note In Bullet Format When Appropriate: No
Number Of Freeze-Thaw Cycles: 2 freeze-thaw cycles
Medical Necessity Clause: This procedure was medically necessary because the lesions that were treated were:
Medical Necessity Information: It is in your best interest to select a reason for this procedure from the list below. All of these items fulfill various CMS LCD requirements except the new and changing color options.
Detail Level: Detailed
Consent: The patient's consent was obtained including but not limited to risks of crusting, scabbing, blistering, scarring, darker or lighter pigmentary change, recurrence, incomplete removal and infection.
Post-Care Instructions: I reviewed with the patient in detail post-care instructions. Patient is to wear sunprotection, and avoid picking at any of the treated lesions. Pt may apply Vaseline to crusted or scabbing areas.
Duration Of Freeze Thaw-Cycle (Seconds): 15
declines

## 2020-02-08 NOTE — DIETITIAN INITIAL EVALUATION ADULT. - PROBLEM SELECTOR PLAN 1
p/w Fingerstick: 500s. Ed course; 1L NS with Humalog 7 units   -Started on Lantus 12 units with 3 pre meal with SS coverage   - [id number]  Dr Ji consulted

## 2020-02-08 NOTE — CONSULT NOTE ADULT - SUBJECTIVE AND OBJECTIVE BOX
HPI:  74M from home with PMH of DM, HTN BIBEMS for generalized itching of 5 days duration. He has never experienced similar symptoms. It has been constant over the past week to the point that he decided to come in. He denies any variation in itching during day and night and mentions that it started all over the body all together. Denies taking any new medication, use any new soap, or bedsheets. Denies any infectious symptoms. No one with similar symptoms in the family. No pain. No fever, chills, n/v/d, abdominal pain.    He lives with 2 sons and daughter in laws, Reports following with PCP 1.5 months back but does not remember the name of the PCP. Unable to recall name of home meds or name of pharmacy. Primary team to follow up on reconciliation of home meds.      He presented with FS: 511, SBP: 180s. Pt reports taking PO meds, denies insulin use, mentions compliance with PO meds.     Son phone number: [phone] (2020 22:27)      PAST MEDICAL & SURGICAL HISTORY:  Diabetes  HTN (hypertension)  No significant past surgical history      No Known Allergies      Meds:  acetaminophen   Tablet .. 650 milliGRAM(s) Oral every 6 hours PRN  amLODIPine   Tablet 5 milliGRAM(s) Oral daily  aspirin  chewable 81 milliGRAM(s) Oral daily  atorvastatin 40 milliGRAM(s) Oral at bedtime  betamethasone valerate 0.1% Cream 1 Application(s) Topical two times a day  calamine Lotion 1 Application(s) Topical every 6 hours PRN  diphenhydrAMINE 25 milliGRAM(s) Oral every 6 hours PRN  famotidine    Tablet 20 milliGRAM(s) Oral two times a day  heparin  Injectable 5000 Unit(s) SubCutaneous every 12 hours  insulin glargine Injectable (LANTUS) 20 Unit(s) SubCutaneous at bedtime  insulin lispro (HumaLOG) corrective regimen sliding scale   SubCutaneous Before meals and at bedtime  insulin lispro Injectable (HumaLOG) 6 Unit(s) SubCutaneous three times a day with meals  loratadine 10 milliGRAM(s) Oral daily  losartan 25 milliGRAM(s) Oral daily  predniSONE   Tablet 40 milliGRAM(s) Oral daily  sodium chloride 0.9%. 1000 milliLiter(s) IV Continuous <Continuous>      SOCIAL HISTORY:  Smoker:  YES / NO        PACK YEARS:                         WHEN QUIT?  ETOH use:  YES / NO               FREQUENCY / QUANTITY:  Ilicit Drug use:  YES / NO  Occupation:  Assisted device use (Cane / Walker):  Live with:    FAMILY HISTORY:  No pertinent family history in first degree relatives      VITALS:  Vital Signs Last 24 Hrs  T(C): 36.7 (2020 15:16), Max: 37 (2020 21:27)  T(F): 98.1 (2020 15:16), Max: 98.6 (2020 21:27)  HR: 89 (2020 15:16) (60 - 89)  BP: 147/72 (2020 15:16) (126/68 - 196/89)  BP(mean): --  RR: 17 (2020 15:16) (16 - 18)  SpO2: 100% (2020 15:16) (95% - 100%)    LABS/DIAGNOSTIC TESTS:                          13.7   8.08  )-----------( 258      ( 2020 06:23 )             42.1     WBC Count: 8.08 K/uL ( @ 06:23)  WBC Count: 7.87 K/uL ( @ 21:04)      -    139  |  106  |  13  ----------------------------<  254<H>  4.2   |  29  |  1.44<H>    Ca    8.9      2020 06:23  Phos  3.3       Mg     2.1         TPro  7.6  /  Alb  3.3<L>  /  TBili  0.9  /  DBili  x   /  AST  25  /  ALT  27  /  AlkPhos  72  02-08      Urinalysis Basic - ( 2020 23:59 )    Color: Yellow / Appearance: Clear / S.010 / pH: x  Gluc: x / Ketone: Negative  / Bili: Negative / Urobili: Negative   Blood: x / Protein: 100 / Nitrite: Negative   Leuk Esterase: Negative / RBC: 0-2 /HPF / WBC 0-2 /HPF   Sq Epi: x / Non Sq Epi: Few /HPF / Bacteria: Moderate /HPF        LIVER FUNCTIONS - ( 2020 06:23 )  Alb: 3.3 g/dL / Pro: 7.6 g/dL / ALK PHOS: 72 U/L / ALT: 27 U/L DA / AST: 25 U/L / GGT: x                 LACTATE:    ABG -     CULTURES:       RADIOLOGY:< from: Xray Chest 1 View-PORTABLE IMMEDIATE (20 @ 19:13) >  EXAM:  XR CHEST PORTABLE IMMED 1V                            PROCEDURE DATE:  2020          INTERPRETATION:  AP semierect chest on 2020 6:58 PM. Patient has generalized itching for 5 days.    COMPARISON: None available. Heart is magnified by technique.    Minimal blunting of the right base laterally is noted.    IMPRESSION: As above.                RALPH BOYER M.D., ATTENDING RADIOLOGIST  This document has been electronically signed. 2020  7:17PM          < end of copied text >        ROS  [  ] UNABLE TO ELICIT HPI:  74M from home with PMH of DM, HTN BIBEMS for generalized itching of 5 days duration. He has never experienced similar symptoms. It has been constant over the past week to the point that he decided to come in. He denies any variation in itching during day and night and mentions that it started all over the body all together. Denies taking any new medication, use any new soap, or bedsheets. Denies any infectious symptoms. No one with similar symptoms in the family. No pain. No fever, chills, n/v/d, abdominal pain.    He lives with 2 sons and daughter in laws, Reports following with PCP 1.5 months back but does not remember the name of the PCP. Unable to recall name of home meds or name of pharmacy. Primary team to follow up on reconciliation of home meds.      He presented with FS: 511, SBP: 180s. Pt reports taking PO meds, denies insulin use, mentions compliance with PO meds.     Son phone number: [phone] (2020 22:27)      History as above , asked to see pt as he has a rash but on further questioning he has had the rash for several weeks and has seen dermatology in the outpatient and told it was an allergic rash. He c/o itching all over his body and that it is a little better here( has been on Prednisone).    PAST MEDICAL & SURGICAL HISTORY:  Diabetes  HTN (hypertension)  No significant past surgical history      No Known Allergies      Meds:  acetaminophen   Tablet .. 650 milliGRAM(s) Oral every 6 hours PRN  amLODIPine   Tablet 5 milliGRAM(s) Oral daily  aspirin  chewable 81 milliGRAM(s) Oral daily  atorvastatin 40 milliGRAM(s) Oral at bedtime  betamethasone valerate 0.1% Cream 1 Application(s) Topical two times a day  calamine Lotion 1 Application(s) Topical every 6 hours PRN  diphenhydrAMINE 25 milliGRAM(s) Oral every 6 hours PRN  famotidine    Tablet 20 milliGRAM(s) Oral two times a day  heparin  Injectable 5000 Unit(s) SubCutaneous every 12 hours  insulin glargine Injectable (LANTUS) 20 Unit(s) SubCutaneous at bedtime  insulin lispro (HumaLOG) corrective regimen sliding scale   SubCutaneous Before meals and at bedtime  insulin lispro Injectable (HumaLOG) 6 Unit(s) SubCutaneous three times a day with meals  loratadine 10 milliGRAM(s) Oral daily  losartan 25 milliGRAM(s) Oral daily  predniSONE   Tablet 40 milliGRAM(s) Oral daily  sodium chloride 0.9%. 1000 milliLiter(s) IV Continuous <Continuous>      SOCIAL HISTORY:  Smoker:  denies  ETOH use:  no    FAMILY HISTORY:  No pertinent family history in first degree relatives      VITALS:  Vital Signs Last 24 Hrs  T(C): 36.7 (2020 15:16), Max: 37 (2020 21:27)  T(F): 98.1 (2020 15:16), Max: 98.6 (2020 21:27)  HR: 89 (2020 15:16) (60 - 89)  BP: 147/72 (2020 15:16) (126/68 - 196/89)  BP(mean): --  RR: 17 (2020 15:16) (16 - 18)  SpO2: 100% (2020 15:16) (95% - 100%)    LABS/DIAGNOSTIC TESTS:                          13.7   8.08  )-----------( 258      ( 2020 06:23 )             42.1     WBC Count: 8.08 K/uL ( @ 06:23)  WBC Count: 7.87 K/uL ( @ 21:04)          139  |  106  |  13  ----------------------------<  254<H>  4.2   |  29  |  1.44<H>    Ca    8.9      2020 06:23  Phos  3.3     -  Mg     2.1         TPro  7.6  /  Alb  3.3<L>  /  TBili  0.9  /  DBili  x   /  AST  25  /  ALT  27  /  AlkPhos  72  0208      Urinalysis Basic - ( 2020 23:59 )    Color: Yellow / Appearance: Clear / S.010 / pH: x  Gluc: x / Ketone: Negative  / Bili: Negative / Urobili: Negative   Blood: x / Protein: 100 / Nitrite: Negative   Leuk Esterase: Negative / RBC: 0-2 /HPF / WBC 0-2 /HPF   Sq Epi: x / Non Sq Epi: Few /HPF / Bacteria: Moderate /HPF        LIVER FUNCTIONS - ( 2020 06:23 )  Alb: 3.3 g/dL / Pro: 7.6 g/dL / ALK PHOS: 72 U/L / ALT: 27 U/L DA / AST: 25 U/L / GGT: x                 LACTATE:    ABG -     CULTURES:       RADIOLOGY:< from: Xray Chest 1 View-PORTABLE IMMEDIATE (20 @ 19:13) >  EXAM:  XR CHEST PORTABLE IMMED 1V                            PROCEDURE DATE:  2020          INTERPRETATION:  AP semierect chest on 2020 6:58 PM. Patient has generalized itching for 5 days.    COMPARISON: None available. Heart is magnified by technique.    Minimal blunting of the right base laterally is noted.    IMPRESSION: As above.                RALPH BOYER M.D., ATTENDING RADIOLOGIST  This document has been electronically signed. 2020  7:17PM          < end of copied text >        ROS  [  ] UNABLE TO ELICIT

## 2020-02-08 NOTE — CONSULT NOTE ADULT - GASTROINTESTINAL DETAILS
bowel sounds normal/soft/no guarding/no rigidity/nontender/no organomegaly/no distention/no masses palpable

## 2020-02-08 NOTE — CONSULT NOTE ADULT - SUBJECTIVE AND OBJECTIVE BOX
Patient is a 74y old  Male who presents with a chief complaint of Itching (2020 11:33)      HPI:  74M from home with PMH of DM, HTN BIBEMS for generalized itching of 5 days duration. He has never experienced similar symptoms. It has been constant over the past week to the point that he decided to come in. He denies any variation in itching during day and night and mentions that it started all over the body all together. Denies taking any new medication, use any new soap, or bedsheets. Denies any infectious symptoms. No one with similar symptoms in the family. No pain. No fever, chills, n/v/d, abdominal pain.    He lives with 2 sons and daughter in laws, Reports following with PCP 1.5 months back but does not remember the name of the PCP. Unable to recall name of home meds or name of pharmacy. Primary team to follow up on reconciliation of home meds.      He presented with FS: 511, SBP: 180s. Pt reports taking PO meds, denies insulin use, mentions compliance with PO meds.     Son phone number: [phone] (2020 22:27)      PAST MEDICAL & SURGICAL HISTORY:  Diabetes  HTN (hypertension)  No significant past surgical history         MEDICATIONS  (STANDING):  amLODIPine   Tablet 5 milliGRAM(s) Oral daily  aspirin  chewable 81 milliGRAM(s) Oral daily  atorvastatin 40 milliGRAM(s) Oral at bedtime  betamethasone valerate 0.1% Cream 1 Application(s) Topical two times a day  famotidine    Tablet 20 milliGRAM(s) Oral two times a day  heparin  Injectable 5000 Unit(s) SubCutaneous every 12 hours  insulin glargine Injectable (LANTUS) 12 Unit(s) SubCutaneous at bedtime  insulin lispro (HumaLOG) corrective regimen sliding scale   SubCutaneous Before meals and at bedtime  insulin lispro Injectable (HumaLOG) 3 Unit(s) SubCutaneous three times a day with meals  loratadine 10 milliGRAM(s) Oral daily  losartan 25 milliGRAM(s) Oral daily  predniSONE   Tablet 40 milliGRAM(s) Oral daily  sodium chloride 0.9%. 1000 milliLiter(s) (80 mL/Hr) IV Continuous <Continuous>    MEDICATIONS  (PRN):  acetaminophen   Tablet .. 650 milliGRAM(s) Oral every 6 hours PRN Temp greater or equal to 38C (100.4F), Mild Pain (1 - 3)  calamine Lotion 1 Application(s) Topical every 6 hours PRN Itching  diphenhydrAMINE 25 milliGRAM(s) Oral every 6 hours PRN Rash and/or Itching      FAMILY HISTORY:  No pertinent family history in first degree relatives      SOCIAL HISTORY:      REVIEW OF SYSTEMS:  CONSTITUTIONAL: No fever, weight loss, or fatigue  EYES: No eye pain, visual disturbances, or discharge  ENT:  No difficulty hearing, tinnitus, vertigo; No sinus or throat pain  NECK: No pain or stiffness  RESPIRATORY: No cough, wheezing, chills or hemoptysis; No Shortness of Breath  CARDIOVASCULAR: No chest pain, palpitations, passing out, dizziness, or leg swelling  GASTROINTESTINAL: No abdominal or epigastric pain. No nausea, vomiting, or hematemesis; No diarrhea or constipation. No melena or hematochezia.  GENITOURINARY: No dysuria, frequency, hematuria, or incontinence  NEUROLOGICAL: No headaches, memory loss, loss of strength, numbness, or tremors  SKIN: No itching, burning, rashes, or lesions   LYMPH Nodes: No enlarged glands  ENDOCRINE: No heat or cold intolerance; No hair loss  MUSCULOSKELETAL: No joint pain or swelling; No muscle, back, or extremity pain  PSYCHIATRIC: No depression, anxiety, mood swings, or difficulty sleeping  HEME/LYMPH: No easy bruising, or bleeding gums  ALLERGY AND IMMUNOLOGIC: No hives or eczema	        Vital Signs Last 24 Hrs  T(C): 36.3 (2020 11:10), Max: 37 (2020 21:27)  T(F): 97.4 (2020 11:10), Max: 98.6 (2020 21:27)  HR: 65 (2020 12:48) (60 - 76)  BP: 126/68 (2020 12:48) (126/68 - 196/89)  BP(mean): --  RR: 17 (2020 11:10) (16 - 18)  SpO2: 99% (2020 12:48) (95% - 100%)      Constitutional:    HEENT: nad    Neck:  No JVD, bruits or thyromegaly    Respiratory:  Clear without rales or rhonchi    Cardiovascular:  RR without murmur, rub or gallop.    Gastrointestinal: Soft without hepatosplenomegaly.    Extremities: without cyanosis, clubbing or edema.    Neurological:  Oriented   x  3    . No gross sensory or motor defects.        LABS:                        13.7   8.08  )-----------( 258      ( 2020 06:23 )             42.1     02-08    139  |  106  |  13  ----------------------------<  254<H>  4.2   |  29  |  1.44<H>    Ca    8.9      2020 06:23  Phos  3.3     02-08  Mg     2.1     02-08    TPro  7.6  /  Alb  3.3<L>  /  TBili  0.9  /  DBili  x   /  AST  25  /  ALT  27  /  AlkPhos  72  02-08    CARDIAC MARKERS ( 2020 06:23 )  <0.015 ng/mL / x     / x     / x     / x      CARDIAC MARKERS ( 2020 21:04 )  <0.015 ng/mL / x     / x     / x     / x            Urinalysis Basic - ( 2020 23:59 )    Color: Yellow / Appearance: Clear / S.010 / pH: x  Gluc: x / Ketone: Negative  / Bili: Negative / Urobili: Negative   Blood: x / Protein: 100 / Nitrite: Negative   Leuk Esterase: Negative / RBC: 0-2 /HPF / WBC 0-2 /HPF   Sq Epi: x / Non Sq Epi: Few /HPF / Bacteria: Moderate /HPF      CAPILLARY BLOOD GLUCOSE      POCT Blood Glucose.: 377 mg/dL (2020 12:23)  POCT Blood Glucose.: 291 mg/dL (2020 07:25)  POCT Blood Glucose.: 297 mg/dL (2020 00:29)  POCT Blood Glucose.: 439 mg/dL (2020 21:00)  POCT Blood Glucose.: 511 mg/dL (2020 17:54)      RADIOLOGY & ADDITIONAL STUDIES:

## 2020-02-08 NOTE — CONSULT NOTE ADULT - PROBLEM SELECTOR RECOMMENDATION 9
hyperglycemic   ?control as out pt  check Hba1c  change lantus to 20 units  increase humalog 6 ac tid  moderate correction doses  fsg ac and hs  adjust insulin as steroids taper   ? need for out pt insulin tx

## 2020-02-08 NOTE — CONSULT NOTE ADULT - RS GEN PE MLT RESP DETAILS PC
clear to auscultation bilaterally/no rhonchi/no rales/no wheezes/good air movement/breath sounds equal

## 2020-02-08 NOTE — DIETITIAN INITIAL EVALUATION ADULT. - OTHER INFO
Pt visited.  Pt is Martha Speaking. Pt  is Alert but Forgetful. Pt Is on diabetic Diet DASH/ TLC. Pt is Lacto ovo vegetarian. Food choices Obtained. F& N Dept notified.  Glucerna shakes  Provided Pt drank Good And Ate Fair for Lunch. Per Pt H/O DM  ( NIDDM) at Home. Per pt he has Two son . But not able to provide Phone Numbers. IVF infusing.

## 2020-02-08 NOTE — PROGRESS NOTE ADULT - SUBJECTIVE AND OBJECTIVE BOX
INTERVAL HPI/OVERNIGHT EVENTS: I feel better so can I go home.   Vital Signs Last 24 Hrs  T(C): 36.7 (2020 15:16), Max: 37 (2020 21:27)  T(F): 98.1 (2020 15:16), Max: 98.6 (2020 21:27)  HR: 89 (2020 15:16) (60 - 89)  BP: 147/72 (2020 15:16) (126/68 - 196/89)  BP(mean): --  RR: 17 (2020 15:16) (16 - 18)  SpO2: 100% (2020 15:16) (95% - 100%)  I&O's Summary    MEDICATIONS  (STANDING):  amLODIPine   Tablet 5 milliGRAM(s) Oral daily  aspirin  chewable 81 milliGRAM(s) Oral daily  atorvastatin 40 milliGRAM(s) Oral at bedtime  betamethasone valerate 0.1% Cream 1 Application(s) Topical two times a day  famotidine    Tablet 20 milliGRAM(s) Oral two times a day  heparin  Injectable 5000 Unit(s) SubCutaneous every 12 hours  insulin glargine Injectable (LANTUS) 20 Unit(s) SubCutaneous at bedtime  insulin lispro (HumaLOG) corrective regimen sliding scale   SubCutaneous Before meals and at bedtime  insulin lispro Injectable (HumaLOG) 6 Unit(s) SubCutaneous three times a day with meals  loratadine 10 milliGRAM(s) Oral daily  losartan 25 milliGRAM(s) Oral daily  predniSONE   Tablet 40 milliGRAM(s) Oral daily  sodium chloride 0.9%. 1000 milliLiter(s) (80 mL/Hr) IV Continuous <Continuous>    MEDICATIONS  (PRN):  acetaminophen   Tablet .. 650 milliGRAM(s) Oral every 6 hours PRN Temp greater or equal to 38C (100.4F), Mild Pain (1 - 3)  calamine Lotion 1 Application(s) Topical every 6 hours PRN Itching  diphenhydrAMINE 25 milliGRAM(s) Oral every 6 hours PRN Rash and/or Itching    LABS:                        13.7   8.08  )-----------( 258      ( 2020 06:23 )             42.1     02-08    139  |  106  |  13  ----------------------------<  254<H>  4.2   |  29  |  1.44<H>    Ca    8.9      2020 06:23  Phos  3.3     02-08  Mg     2.1         TPro  7.6  /  Alb  3.3<L>  /  TBili  0.9  /  DBili  x   /  AST  25  /  ALT  27  /  AlkPhos  72  02      Urinalysis Basic - ( 2020 23:59 )    Color: Yellow / Appearance: Clear / S.010 / pH: x  Gluc: x / Ketone: Negative  / Bili: Negative / Urobili: Negative   Blood: x / Protein: 100 / Nitrite: Negative   Leuk Esterase: Negative / RBC: 0-2 /HPF / WBC 0-2 /HPF   Sq Epi: x / Non Sq Epi: Few /HPF / Bacteria: Moderate /HPF      CAPILLARY BLOOD GLUCOSE      POCT Blood Glucose.: 479 mg/dL (2020 16:40)  POCT Blood Glucose.: 377 mg/dL (2020 12:23)  POCT Blood Glucose.: 291 mg/dL (2020 07:25)  POCT Blood Glucose.: 297 mg/dL (2020 00:29)  POCT Blood Glucose.: 439 mg/dL (2020 21:00)  POCT Blood Glucose.: 511 mg/dL (2020 17:54)        Urinalysis Basic - ( 2020 23:59 )    Color: Yellow / Appearance: Clear / S.010 / pH: x  Gluc: x / Ketone: Negative  / Bili: Negative / Urobili: Negative   Blood: x / Protein: 100 / Nitrite: Negative   Leuk Esterase: Negative / RBC: 0-2 /HPF / WBC 0-2 /HPF   Sq Epi: x / Non Sq Epi: Few /HPF / Bacteria: Moderate /HPF      REVIEW OF SYSTEMS:  CONSTITUTIONAL: No fever, weight loss, or fatigue  EYES: No eye pain, visual disturbances, or discharge  ENMT:  No difficulty hearing, tinnitus, vertigo; No sinus or throat pain  NECK: No pain or stiffness  RESPIRATORY: No cough, wheezing, chills or hemoptysis; No shortness of breath  CARDIOVASCULAR: No chest pain, palpitations, dizziness, or leg swelling  GASTROINTESTINAL: No abdominal or epigastric pain. No nausea, vomiting, or hematemesis; No diarrhea or constipation. No melena or hematochezia.  GENITOURINARY: No dysuria, frequency, hematuria, or incontinence  NEUROLOGICAL: No headaches, memory loss, loss of strength, numbness, or tremors  SKIN: itching,    Consultant(s) Notes Reviewed:  [x ] YES  [ ] NO    PHYSICAL EXAM:  GENERAL: NAD, well-groomed, well-developed, not in any distress ,  HEAD:  Atraumatic, Normocephalic  EYES: EOMI, PERRLA, conjunctiva and sclera clear  ENMT: No tonsillar erythema, exudates, or enlargement; Moist mucous membranes, Good dentition, No lesions  NECK: Supple, No JVD, Normal thyroid  NERVOUS SYSTEM:  Alert & Oriented X3, No focal deficit   CHEST/LUNG: Good air entry bilateral with no  rales, rhonchi, wheezing, or rubs  HEART: Regular rate and rhythm; No murmurs, rubs, or gallops  ABDOMEN: Soft, Nontender, Nondistended; Bowel sounds present  EXTREMITIES:  2+ Peripheral Pulses, No clubbing, cyanosis, or edema  SKIN: scrqatch marks     Care Discussed with Consultants/Other Providers [ x] YES  [ ] NO

## 2020-02-08 NOTE — DIETITIAN INITIAL EVALUATION ADULT. - PROBLEM SELECTOR PLAN 2
p/w Creat: 1.58  Rossy vs CKD   IV fluid : gentle hydration   - urine lytes   US renal   Monitor BMP  Dr Huggins consulted

## 2020-02-08 NOTE — CONSULT NOTE ADULT - SUBJECTIVE AND OBJECTIVE BOX
[facility] [place] NEPHROLOGY PRACTICE   MD Azam Thompson MD Fatima Sheikh, D.O. Ruoru Wong, PA    From 7 AM - 5 PM:  OFFICE: [phone]  Dr. Huggins cell: [phone]  Dr. White Cell: [phone]  Dr. Pina cell: [phone]  DONA Yadav cell: [phone]    From 5 PM - 7 AM: Answering Service: [phone]      -- INITIAL RENAL CONSULT NOTE  --------------------------------------------------------------------------------  HPI: 74M from home with PMH of DM, HTN BIBEMS for generalized itching of 5 days duration. He has never experienced similar symptoms. Pt does not know his medications or doctors. Longstanding uncontrolled DM for many years. Denies past hx of renal disease or difficulty with urinating    PAST HISTORY  --------------------------------------------------------------------------------  PAST MEDICAL & SURGICAL HISTORY:  Diabetes  HTN (hypertension)  No significant past surgical history    FAMILY HISTORY:  No pertinent family history in first degree relatives    PAST SOCIAL HISTORY:    ALLERGIES & MEDICATIONS  --------------------------------------------------------------------------------  Allergies    No Known Allergies    Intolerances      Standing Inpatient Medications  amLODIPine   Tablet 5 milliGRAM(s) Oral daily  aspirin  chewable 81 milliGRAM(s) Oral daily  atorvastatin 40 milliGRAM(s) Oral at bedtime  betamethasone valerate 0.1% Cream 1 Application(s) Topical two times a day  famotidine    Tablet 20 milliGRAM(s) Oral two times a day  heparin  Injectable 5000 Unit(s) SubCutaneous every 12 hours  insulin glargine Injectable (LANTUS) 12 Unit(s) SubCutaneous at bedtime  insulin lispro (HumaLOG) corrective regimen sliding scale   SubCutaneous Before meals and at bedtime  insulin lispro Injectable (HumaLOG) 3 Unit(s) SubCutaneous three times a day with meals  loratadine 10 milliGRAM(s) Oral daily  losartan 25 milliGRAM(s) Oral daily  predniSONE   Tablet 40 milliGRAM(s) Oral daily  sodium chloride 0.9%. 1000 milliLiter(s) IV Continuous <Continuous>    PRN Inpatient Medications  acetaminophen   Tablet .. 650 milliGRAM(s) Oral every 6 hours PRN  calamine Lotion 1 Application(s) Topical every 6 hours PRN  diphenhydrAMINE 25 milliGRAM(s) Oral every 6 hours PRN      REVIEW OF SYSTEMS  --------------------------------------------------------------------------------  Gen: No fevers/chills  Skin: No rashes  Head/Eyes/Ears: Normal hearing,  Normal vision   Respiratory: No dyspnea, cough  CV: No chest pain  GI: No abdominal pain, diarrhea, constipation, nausea, vomiting  : No dysuria, hematuria  MSK: No  edema  Heme: No easy bruising or bleeding  Psych: No significant depression    All other systems were reviewed and are negative, except as noted.    VITALS/PHYSICAL EXAM  --------------------------------------------------------------------------------  T(C): 36.3 (02-08-20 @ 11:10), Max: 37 (02-07-20 @ 21:27)  HR: 70 (02-08-20 @ 11:10) (60 - 76)  BP: 128/65 (02-08-20 @ 11:10) (128/65 - 196/89)  RR: 17 (02-08-20 @ 11:10) (16 - 18)  SpO2: 98% (02-08-20 @ 11:10) (95% - 100%)  Wt(kg): --  Height (cm): 167.64 (02-07-20 @ 18:12)  Weight (kg): 63.5 (02-07-20 @ 18:12)  BMI (kg/m2): 22.6 (02-07-20 @ 18:12)  BSA (m2): 1.72 (02-07-20 @ 18:12)      Physical Exam:  	Gen: NAD  	HEENT: MMM  	Pulm: CTA B/L  	CV: S1S2  	Abd: Soft, +BS   	Ext: No LE edema B/L  	Neuro: Awake  	Skin: Warm and dry +excoriations       LABS/STUDIES  --------------------------------------------------------------------------------              13.7   8.08  >-----------<  258      [02-08-20 @ 06:23]              42.1     139  |  106  |  13  ----------------------------<  254      [02-08-20 @ 06:23]  4.2   |  29  |  1.44        Ca     8.9     [02-08-20 @ 06:23]      Mg     2.1     [02-08-20 @ 06:23]      Phos  3.3     [02-08-20 @ 06:23]    TPro  7.6  /  Alb  3.3  /  TBili  0.9  /  DBili  x   /  AST  25  /  ALT  27  /  AlkPhos  72  [02-08-20 @ 06:23]    Troponin <0.015      [02-08-20 @ 06:23]    Creatinine Trend:  SCr 1.44 [02-08 @ 06:23]  SCr 1.58 [02-07 @ 21:04]    Urinalysis - [02-07-20 @ 23:59]      Color Yellow / Appearance Clear / SG 1.010 / pH 5.0      Gluc 1000 / Ketone Negative  / Bili Negative / Urobili Negative       Blood Negative / Protein 100 / Leuk Est Negative / Nitrite Negative      RBC 0-2 / WBC 0-2 / Hyaline 0-2 / Gran  / Sq Epi  / Non Sq Epi Few / Bacteria Moderate    Urine Creatinine 42      [02-08-20 @ 09:21]  Urine Sodium 80      [02-08-20 @ 09:21]  Urine Osmolality 355      [02-08-20 @ 09:21]    TSH 2.26      [02-08-20 @ 06:23]  Lipid: chol 139, TG 81, HDL 56, LDL 67      [02-08-20 @ 06:23]

## 2020-02-08 NOTE — DIETITIAN INITIAL EVALUATION ADULT. - PROBLEM SELECTOR PLAN 3
- p/w rash with itching, unlikely scabies   - Will give PO Prednisone 40 mg   - Loratadine   -Iv benadryl PRN   -Calamine lotion  Dr Rangel consulted

## 2020-02-08 NOTE — PHYSICAL THERAPY INITIAL EVALUATION ADULT - CRITERIA FOR SKILLED THERAPEUTIC INTERVENTIONS
rehab potential/functional limitations in following categories/risk reduction/prevention/impairments found/anticipated discharge recommendation

## 2020-02-09 LAB
ANION GAP SERPL CALC-SCNC: 6 MMOL/L — SIGNIFICANT CHANGE UP (ref 5–17)
BUN SERPL-MCNC: 14 MG/DL — SIGNIFICANT CHANGE UP (ref 7–18)
CALCIUM SERPL-MCNC: 9 MG/DL — SIGNIFICANT CHANGE UP (ref 8.4–10.5)
CHLORIDE SERPL-SCNC: 103 MMOL/L — SIGNIFICANT CHANGE UP (ref 96–108)
CO2 SERPL-SCNC: 27 MMOL/L — SIGNIFICANT CHANGE UP (ref 22–31)
CREAT SERPL-MCNC: 1.37 MG/DL — HIGH (ref 0.5–1.3)
GLUCOSE BLDC GLUCOMTR-MCNC: 285 MG/DL — HIGH (ref 70–99)
GLUCOSE BLDC GLUCOMTR-MCNC: 287 MG/DL — HIGH (ref 70–99)
GLUCOSE BLDC GLUCOMTR-MCNC: 295 MG/DL — HIGH (ref 70–99)
GLUCOSE BLDC GLUCOMTR-MCNC: 327 MG/DL — HIGH (ref 70–99)
GLUCOSE BLDC GLUCOMTR-MCNC: 375 MG/DL — HIGH (ref 70–99)
GLUCOSE SERPL-MCNC: 298 MG/DL — HIGH (ref 70–99)
HBA1C BLD-MCNC: >15.5 % — HIGH (ref 4–5.6)
HBA1C BLD-MCNC: >15.5 % — HIGH (ref 4–5.6)
HCT VFR BLD CALC: 42.7 % — SIGNIFICANT CHANGE UP (ref 39–50)
HGB BLD-MCNC: 14 G/DL — SIGNIFICANT CHANGE UP (ref 13–17)
MCHC RBC-ENTMCNC: 29.9 PG — SIGNIFICANT CHANGE UP (ref 27–34)
MCHC RBC-ENTMCNC: 32.8 GM/DL — SIGNIFICANT CHANGE UP (ref 32–36)
MCV RBC AUTO: 91.2 FL — SIGNIFICANT CHANGE UP (ref 80–100)
MRSA PCR RESULT.: SIGNIFICANT CHANGE UP
NRBC # BLD: 0 /100 WBCS — SIGNIFICANT CHANGE UP (ref 0–0)
PLATELET # BLD AUTO: 269 K/UL — SIGNIFICANT CHANGE UP (ref 150–400)
POTASSIUM SERPL-MCNC: 4.1 MMOL/L — SIGNIFICANT CHANGE UP (ref 3.5–5.3)
POTASSIUM SERPL-SCNC: 4.1 MMOL/L — SIGNIFICANT CHANGE UP (ref 3.5–5.3)
RBC # BLD: 4.68 M/UL — SIGNIFICANT CHANGE UP (ref 4.2–5.8)
RBC # FLD: 14.7 % — HIGH (ref 10.3–14.5)
S AUREUS DNA NOSE QL NAA+PROBE: DETECTED
SODIUM SERPL-SCNC: 136 MMOL/L — SIGNIFICANT CHANGE UP (ref 135–145)
WBC # BLD: 10.31 K/UL — SIGNIFICANT CHANGE UP (ref 3.8–10.5)
WBC # FLD AUTO: 10.31 K/UL — SIGNIFICANT CHANGE UP (ref 3.8–10.5)

## 2020-02-09 PROCEDURE — 76775 US EXAM ABDO BACK WALL LIM: CPT | Mod: 26

## 2020-02-09 RX ADMIN — ATORVASTATIN CALCIUM 40 MILLIGRAM(S): 80 TABLET, FILM COATED ORAL at 21:24

## 2020-02-09 RX ADMIN — FAMOTIDINE 20 MILLIGRAM(S): 10 INJECTION INTRAVENOUS at 07:04

## 2020-02-09 RX ADMIN — HEPARIN SODIUM 5000 UNIT(S): 5000 INJECTION INTRAVENOUS; SUBCUTANEOUS at 07:12

## 2020-02-09 RX ADMIN — Medication 81 MILLIGRAM(S): at 11:46

## 2020-02-09 RX ADMIN — FAMOTIDINE 20 MILLIGRAM(S): 10 INJECTION INTRAVENOUS at 18:03

## 2020-02-09 RX ADMIN — Medication 3: at 11:46

## 2020-02-09 RX ADMIN — Medication 1 MILLIGRAM(S): at 03:48

## 2020-02-09 RX ADMIN — Medication 25 MILLIGRAM(S): at 21:26

## 2020-02-09 RX ADMIN — Medication 1 APPLICATION(S): at 07:12

## 2020-02-09 RX ADMIN — Medication 25 MILLIGRAM(S): at 14:48

## 2020-02-09 RX ADMIN — Medication 3: at 08:22

## 2020-02-09 RX ADMIN — Medication 1 APPLICATION(S): at 18:03

## 2020-02-09 RX ADMIN — Medication 6 UNIT(S): at 08:23

## 2020-02-09 RX ADMIN — LORATADINE 10 MILLIGRAM(S): 10 TABLET ORAL at 11:46

## 2020-02-09 RX ADMIN — Medication 40 MILLIGRAM(S): at 07:03

## 2020-02-09 RX ADMIN — LOSARTAN POTASSIUM 25 MILLIGRAM(S): 100 TABLET, FILM COATED ORAL at 07:04

## 2020-02-09 RX ADMIN — Medication 6 UNIT(S): at 11:46

## 2020-02-09 RX ADMIN — AMLODIPINE BESYLATE 5 MILLIGRAM(S): 2.5 TABLET ORAL at 07:04

## 2020-02-09 RX ADMIN — INSULIN GLARGINE 20 UNIT(S): 100 INJECTION, SOLUTION SUBCUTANEOUS at 21:25

## 2020-02-09 RX ADMIN — Medication 4: at 21:25

## 2020-02-09 RX ADMIN — HEPARIN SODIUM 5000 UNIT(S): 5000 INJECTION INTRAVENOUS; SUBCUTANEOUS at 18:03

## 2020-02-09 RX ADMIN — Medication 6 UNIT(S): at 18:04

## 2020-02-09 RX ADMIN — Medication 5: at 18:03

## 2020-02-09 NOTE — PROGRESS NOTE ADULT - SUBJECTIVE AND OBJECTIVE BOX
INTERVAL HPI/OVERNIGHT EVENTS: Seen and examined with son and daughter in law in [place]   Vital Signs Last 24 Hrs  T(C): 36.8 (2020 14:14), Max: 36.8 (2020 14:14)  T(F): 98.3 (2020 14:14), Max: 98.3 (2020 14:14)  HR: 84 (2020 14:14) (67 - 84)  BP: 123/60 (2020 14:14) (122/61 - 140/69)  BP(mean): --  RR: 18 (2020 14:14) (18 - 18)  SpO2: 99% (2020 14:14) (99% - 100%)  I&O's Summary    MEDICATIONS  (STANDING):  amLODIPine   Tablet 5 milliGRAM(s) Oral daily  aspirin  chewable 81 milliGRAM(s) Oral daily  atorvastatin 40 milliGRAM(s) Oral at bedtime  betamethasone valerate 0.1% Cream 1 Application(s) Topical two times a day  famotidine    Tablet 20 milliGRAM(s) Oral two times a day  heparin  Injectable 5000 Unit(s) SubCutaneous every 12 hours  insulin glargine Injectable (LANTUS) 20 Unit(s) SubCutaneous at bedtime  insulin lispro (HumaLOG) corrective regimen sliding scale   SubCutaneous Before meals and at bedtime  insulin lispro Injectable (HumaLOG) 6 Unit(s) SubCutaneous three times a day with meals  loratadine 10 milliGRAM(s) Oral daily  losartan 25 milliGRAM(s) Oral daily  predniSONE   Tablet 40 milliGRAM(s) Oral daily  sodium chloride 0.9%. 1000 milliLiter(s) (80 mL/Hr) IV Continuous <Continuous>    MEDICATIONS  (PRN):  acetaminophen   Tablet .. 650 milliGRAM(s) Oral every 6 hours PRN Temp greater or equal to 38C (100.4F), Mild Pain (1 - 3)  calamine Lotion 1 Application(s) Topical every 6 hours PRN Itching  diphenhydrAMINE 25 milliGRAM(s) Oral every 6 hours PRN Rash and/or Itching    LABS:                        14.0   10.31 )-----------( 269      ( 2020 07:03 )             42.7     02-09    136  |  103  |  14  ----------------------------<  298<H>  4.1   |  27  |  1.37<H>    Ca    9.0      2020 07:03  Phos  3.3     02  Mg     2.1         TPro  7.6  /  Alb  3.3<L>  /  TBili  0.9  /  DBili  x   /  AST  25  /  ALT  27  /  AlkPhos  72  02      Urinalysis Basic - ( 2020 23:59 )    Color: Yellow / Appearance: Clear / S.010 / pH: x  Gluc: x / Ketone: Negative  / Bili: Negative / Urobili: Negative   Blood: x / Protein: 100 / Nitrite: Negative   Leuk Esterase: Negative / RBC: 0-2 /HPF / WBC 0-2 /HPF   Sq Epi: x / Non Sq Epi: Few /HPF / Bacteria: Moderate /HPF      CAPILLARY BLOOD GLUCOSE      POCT Blood Glucose.: 375 mg/dL (2020 17:08)  POCT Blood Glucose.: 295 mg/dL (2020 11:32)  POCT Blood Glucose.: 287 mg/dL (2020 08:08)  POCT Blood Glucose.: 285 mg/dL (2020 03:48)  POCT Blood Glucose.: 292 mg/dL (2020 22:05)  POCT Blood Glucose.: 67 mg/dL (2020 21:08)  POCT Blood Glucose.: 256 mg/dL (2020 19:06)        Urinalysis Basic - ( 2020 23:59 )    Color: Yellow / Appearance: Clear / S.010 / pH: x  Gluc: x / Ketone: Negative  / Bili: Negative / Urobili: Negative   Blood: x / Protein: 100 / Nitrite: Negative   Leuk Esterase: Negative / RBC: 0-2 /HPF / WBC 0-2 /HPF   Sq Epi: x / Non Sq Epi: Few /HPF / Bacteria: Moderate /HPF      REVIEW OF SYSTEMS:  CONSTITUTIONAL: No fever, weight loss, or fatigue  EYES: No eye pain, visual disturbances, or discharge  ENMT:  No difficulty hearing, tinnitus, vertigo; No sinus or throat pain  RESPIRATORY: No cough, wheezing, chills or hemoptysis; No shortness of breath  CARDIOVASCULAR: No chest pain, palpitations, dizziness, or leg swelling  GASTROINTESTINAL: No abdominal or epigastric pain. No nausea, vomiting, or hematemesis; No diarrhea or constipation. No melena or hematochezia.  GENITOURINARY: No dysuria, frequency, hematuria, or incontinence      Consultant(s) Notes Reviewed:  [x ] YES  [ ] NO    PHYSICAL EXAM:  GENERAL: NAD, well-groomed, well-developed,not in any distress ,  HEAD:  Atraumatic, Normocephalic  EYES: EOMI, PERRLA, conjunctiva and sclera clear  ENMT: No tonsillar erythema, exudates, or enlargement; Moist mucous membranes, Good dentition, No lesions  NECK: Supple, No JVD, Normal thyroid  NERVOUS SYSTEM:  Alert & Oriented X2 to 3, No focal deficit   CHEST/LUNG: Good air entry bilateral with no  rales, rhonchi, wheezing, or rubs  HEART: Regular rate and rhythm; No murmurs, rubs, or gallops  ABDOMEN: Soft, Nontender, Nondistended; Bowel sounds present  EXTREMITIES:  2+ Peripheral Pulses, No clubbing, cyanosis, or edema  SKIN: rash +    Care Discussed with Consultants/Other Providers [ x] YES  [ ] NO

## 2020-02-09 NOTE — PROGRESS NOTE ADULT - SUBJECTIVE AND OBJECTIVE BOX
[facility] [place] [facility]PHROLOGY PRACTICE   MD Azam Thompson MD, D.O. Ruoru Wong, [place]    From 7 AM - 5 PM:  OFFICE: [phone]  Dr. Huggins cell: [phone]  Dr. White cell: [phone]  Dr. Pina cell: [phone]  DONA Yadav cell: [phone]    From 5 PM - 7 AM: Answering Service: [phone]      RENAL FOLLOW UP NOTE  --------------------------------------------------------------------------------  HPI: Pt seen and examined at bedside. Family present   Denies SOB, chest pain or LE edema     PAST HISTORY  --------------------------------------------------------------------------------  No significant changes to PMH, PSH, FHx, SHx, unless otherwise noted    ALLERGIES & MEDICATIONS  --------------------------------------------------------------------------------  Allergies    No Known Allergies    Intolerances      Standing Inpatient Medications  amLODIPine   Tablet 5 milliGRAM(s) Oral daily  aspirin  chewable 81 milliGRAM(s) Oral daily  atorvastatin 40 milliGRAM(s) Oral at bedtime  betamethasone valerate 0.1% Cream 1 Application(s) Topical two times a day  famotidine    Tablet 20 milliGRAM(s) Oral two times a day  heparin  Injectable 5000 Unit(s) SubCutaneous every 12 hours  insulin glargine Injectable (LANTUS) 20 Unit(s) SubCutaneous at bedtime  insulin lispro (HumaLOG) corrective regimen sliding scale   SubCutaneous Before meals and at bedtime  insulin lispro Injectable (HumaLOG) 6 Unit(s) SubCutaneous three times a day with meals  loratadine 10 milliGRAM(s) Oral daily  losartan 25 milliGRAM(s) Oral daily  predniSONE   Tablet 40 milliGRAM(s) Oral daily  sodium chloride 0.9%. 1000 milliLiter(s) IV Continuous <Continuous>    PRN Inpatient Medications  acetaminophen   Tablet .. 650 milliGRAM(s) Oral every 6 hours PRN  calamine Lotion 1 Application(s) Topical every 6 hours PRN  diphenhydrAMINE 25 milliGRAM(s) Oral every 6 hours PRN      REVIEW OF SYSTEMS  --------------------------------------------------------------------------------  General: no fever  CVS: no chest pain  RESP: no sob, no cough  ABD: no abdominal pain  : no dysuria  MSK: no edema     VITALS/PHYSICAL EXAM  --------------------------------------------------------------------------------  T(C): 36.8 (02-09-20 @ 14:14), Max: 36.8 (02-09-20 @ 14:14)  HR: 84 (02-09-20 @ 14:14) (67 - 84)  BP: 123/60 (02-09-20 @ 14:14) (122/61 - 140/69)  RR: 18 (02-09-20 @ 14:14) (18 - 18)  SpO2: 99% (02-09-20 @ 14:14) (99% - 100%)  Wt(kg): --  Height (cm): 167.64 (02-07-20 @ 18:12)  Weight (kg): 63.5 (02-07-20 @ 18:12)  BMI (kg/m2): 22.6 (02-07-20 @ 18:12)  BSA (m2): 1.72 (02-07-20 @ 18:12)      Physical Exam:  	Gen: NAD  	HEENT: MMM  	Pulm: CTA B/L  	CV: S1S2  	Abd: Soft, +BS  	Ext: No LE edema B/L                      Neuro: Awake   	Skin: Warm and Dry + excoriations   	  LABS/STUDIES  --------------------------------------------------------------------------------              14.0   10.31 >-----------<  269      [02-09-20 @ 07:03]              42.7     136  |  103  |  14  ----------------------------<  298      [02-09-20 @ 07:03]  4.1   |  27  |  1.37        Ca     9.0     [02-09-20 @ 07:03]      Mg     2.1     [02-08-20 @ 06:23]      Phos  3.3     [02-08-20 @ 06:23]    TPro  7.6  /  Alb  3.3  /  TBili  0.9  /  DBili  x   /  AST  25  /  ALT  27  /  AlkPhos  72  [02-08-20 @ 06:23]      Troponin <0.015      [02-08-20 @ 06:23]    Creatinine Trend:  SCr 1.37 [02-09 @ 07:03]  SCr 1.44 [02-08 @ 06:23]  SCr 1.58 [02-07 @ 21:04]    Urinalysis - [02-07-20 @ 23:59]      Color Yellow / Appearance Clear / SG 1.010 / pH 5.0      Gluc 1000 / Ketone Negative  / Bili Negative / Urobili Negative       Blood Negative / Protein 100 / Leuk Est Negative / Nitrite Negative      RBC 0-2 / WBC 0-2 / Hyaline 0-2 / Gran  / Sq Epi  / Non Sq Epi Few / Bacteria Moderate    Urine Creatinine 42      [02-08-20 @ 09:21]  Urine Sodium 80      [02-08-20 @ 09:21]  Urine Urea Nitrogen 210      [02-08-20 @ 17:37]  Urine Osmolality 355      [02-08-20 @ 09:21]    Vitamin D (25OH) 33.7      [02-08-20 @ 11:46]  HbA1c >15.5      [02-09-20 @ 10:50]  TSH 2.26      [02-08-20 @ 06:23]  Lipid: chol 139, TG 81, HDL 56, LDL 67      [02-08-20 @ 06:23]

## 2020-02-10 DIAGNOSIS — L28.2 OTHER PRURIGO: ICD-10-CM

## 2020-02-10 DIAGNOSIS — I10 ESSENTIAL (PRIMARY) HYPERTENSION: ICD-10-CM

## 2020-02-10 DIAGNOSIS — Z02.9 ENCOUNTER FOR ADMINISTRATIVE EXAMINATIONS, UNSPECIFIED: ICD-10-CM

## 2020-02-10 DIAGNOSIS — E11.65 TYPE 2 DIABETES MELLITUS WITH HYPERGLYCEMIA: ICD-10-CM

## 2020-02-10 LAB
ANION GAP SERPL CALC-SCNC: 7 MMOL/L — SIGNIFICANT CHANGE UP (ref 5–17)
BUN SERPL-MCNC: 20 MG/DL — HIGH (ref 7–18)
CALCIUM SERPL-MCNC: 9.3 MG/DL — SIGNIFICANT CHANGE UP (ref 8.4–10.5)
CHLORIDE SERPL-SCNC: 104 MMOL/L — SIGNIFICANT CHANGE UP (ref 96–108)
CO2 SERPL-SCNC: 28 MMOL/L — SIGNIFICANT CHANGE UP (ref 22–31)
CREAT ?TM UR-MCNC: 42 MG/DL — SIGNIFICANT CHANGE UP
CREAT SERPL-MCNC: 1.46 MG/DL — HIGH (ref 0.5–1.3)
GLUCOSE BLDC GLUCOMTR-MCNC: 135 MG/DL — HIGH (ref 70–99)
GLUCOSE BLDC GLUCOMTR-MCNC: 174 MG/DL — HIGH (ref 70–99)
GLUCOSE BLDC GLUCOMTR-MCNC: 298 MG/DL — HIGH (ref 70–99)
GLUCOSE BLDC GLUCOMTR-MCNC: 342 MG/DL — HIGH (ref 70–99)
GLUCOSE BLDC GLUCOMTR-MCNC: 394 MG/DL — HIGH (ref 70–99)
GLUCOSE BLDC GLUCOMTR-MCNC: 407 MG/DL — HIGH (ref 70–99)
GLUCOSE SERPL-MCNC: 197 MG/DL — HIGH (ref 70–99)
HCT VFR BLD CALC: 41.9 % — SIGNIFICANT CHANGE UP (ref 39–50)
HGB BLD-MCNC: 13.6 G/DL — SIGNIFICANT CHANGE UP (ref 13–17)
MCHC RBC-ENTMCNC: 30 PG — SIGNIFICANT CHANGE UP (ref 27–34)
MCHC RBC-ENTMCNC: 32.5 GM/DL — SIGNIFICANT CHANGE UP (ref 32–36)
MCV RBC AUTO: 92.5 FL — SIGNIFICANT CHANGE UP (ref 80–100)
NRBC # BLD: 0 /100 WBCS — SIGNIFICANT CHANGE UP (ref 0–0)
PLATELET # BLD AUTO: 267 K/UL — SIGNIFICANT CHANGE UP (ref 150–400)
POTASSIUM SERPL-MCNC: 4.1 MMOL/L — SIGNIFICANT CHANGE UP (ref 3.5–5.3)
POTASSIUM SERPL-SCNC: 4.1 MMOL/L — SIGNIFICANT CHANGE UP (ref 3.5–5.3)
PROT ?TM UR-MCNC: 86 MG/DL — HIGH (ref 0–12)
RBC # BLD: 4.53 M/UL — SIGNIFICANT CHANGE UP (ref 4.2–5.8)
RBC # FLD: 14.8 % — HIGH (ref 10.3–14.5)
SODIUM SERPL-SCNC: 139 MMOL/L — SIGNIFICANT CHANGE UP (ref 135–145)
WBC # BLD: 10.95 K/UL — HIGH (ref 3.8–10.5)
WBC # FLD AUTO: 10.95 K/UL — HIGH (ref 3.8–10.5)

## 2020-02-10 RX ORDER — INSULIN LISPRO 100/ML
VIAL (ML) SUBCUTANEOUS
Refills: 0 | Status: DISCONTINUED | OUTPATIENT
Start: 2020-02-10 | End: 2020-02-11

## 2020-02-10 RX ORDER — INSULIN GLARGINE 100 [IU]/ML
20 INJECTION, SOLUTION SUBCUTANEOUS AT BEDTIME
Refills: 0 | Status: DISCONTINUED | OUTPATIENT
Start: 2020-02-10 | End: 2020-02-11

## 2020-02-10 RX ORDER — CHLORHEXIDINE GLUCONATE 213 G/1000ML
1 SOLUTION TOPICAL
Refills: 0 | Status: DISCONTINUED | OUTPATIENT
Start: 2020-02-10 | End: 2020-02-11

## 2020-02-10 RX ORDER — INSULIN LISPRO 100/ML
8 VIAL (ML) SUBCUTANEOUS
Refills: 0 | Status: DISCONTINUED | OUTPATIENT
Start: 2020-02-10 | End: 2020-02-10

## 2020-02-10 RX ORDER — INSULIN LISPRO 100/ML
8 VIAL (ML) SUBCUTANEOUS
Refills: 0 | Status: DISCONTINUED | OUTPATIENT
Start: 2020-02-10 | End: 2020-02-11

## 2020-02-10 RX ORDER — MUPIROCIN 20 MG/G
1 OINTMENT TOPICAL
Refills: 0 | Status: DISCONTINUED | OUTPATIENT
Start: 2020-02-10 | End: 2020-02-10

## 2020-02-10 RX ORDER — SODIUM CHLORIDE 9 MG/ML
1000 INJECTION INTRAMUSCULAR; INTRAVENOUS; SUBCUTANEOUS
Refills: 0 | Status: DISCONTINUED | OUTPATIENT
Start: 2020-02-10 | End: 2020-02-11

## 2020-02-10 RX ORDER — MUPIROCIN 20 MG/G
1 OINTMENT TOPICAL
Refills: 0 | Status: DISCONTINUED | OUTPATIENT
Start: 2020-02-10 | End: 2020-02-11

## 2020-02-10 RX ADMIN — LORATADINE 10 MILLIGRAM(S): 10 TABLET ORAL at 12:21

## 2020-02-10 RX ADMIN — Medication 81 MILLIGRAM(S): at 12:21

## 2020-02-10 RX ADMIN — Medication 6: at 12:21

## 2020-02-10 RX ADMIN — Medication 8 UNIT(S): at 12:21

## 2020-02-10 RX ADMIN — HEPARIN SODIUM 5000 UNIT(S): 5000 INJECTION INTRAVENOUS; SUBCUTANEOUS at 05:41

## 2020-02-10 RX ADMIN — MUPIROCIN 1 APPLICATION(S): 20 OINTMENT TOPICAL at 17:15

## 2020-02-10 RX ADMIN — Medication 25 MILLIGRAM(S): at 05:41

## 2020-02-10 RX ADMIN — CALAMINE AND ZINC OXIDE AND PHENOL 1 APPLICATION(S): 160; 10 LOTION TOPICAL at 11:32

## 2020-02-10 RX ADMIN — FAMOTIDINE 20 MILLIGRAM(S): 10 INJECTION INTRAVENOUS at 05:41

## 2020-02-10 RX ADMIN — Medication 1 APPLICATION(S): at 17:15

## 2020-02-10 RX ADMIN — Medication 1 APPLICATION(S): at 05:42

## 2020-02-10 RX ADMIN — HEPARIN SODIUM 5000 UNIT(S): 5000 INJECTION INTRAVENOUS; SUBCUTANEOUS at 17:15

## 2020-02-10 RX ADMIN — FAMOTIDINE 20 MILLIGRAM(S): 10 INJECTION INTRAVENOUS at 17:15

## 2020-02-10 RX ADMIN — AMLODIPINE BESYLATE 5 MILLIGRAM(S): 2.5 TABLET ORAL at 05:41

## 2020-02-10 RX ADMIN — Medication 6 UNIT(S): at 08:56

## 2020-02-10 RX ADMIN — ATORVASTATIN CALCIUM 40 MILLIGRAM(S): 80 TABLET, FILM COATED ORAL at 21:32

## 2020-02-10 RX ADMIN — Medication 8: at 17:16

## 2020-02-10 RX ADMIN — Medication 8 UNIT(S): at 17:16

## 2020-02-10 RX ADMIN — INSULIN GLARGINE 20 UNIT(S): 100 INJECTION, SOLUTION SUBCUTANEOUS at 22:08

## 2020-02-10 RX ADMIN — Medication 40 MILLIGRAM(S): at 05:41

## 2020-02-10 RX ADMIN — SODIUM CHLORIDE 80 MILLILITER(S): 9 INJECTION INTRAMUSCULAR; INTRAVENOUS; SUBCUTANEOUS at 11:32

## 2020-02-10 RX ADMIN — LOSARTAN POTASSIUM 25 MILLIGRAM(S): 100 TABLET, FILM COATED ORAL at 05:41

## 2020-02-10 NOTE — PROGRESS NOTE ADULT - SUBJECTIVE AND OBJECTIVE BOX
INTERVAL HPI/OVERNIGHT EVENTS: I feel fine so ready to go home. I can call my son .   Vital Signs Last 24 Hrs  T(C): 36.8 (10 Feb 2020 13:55), Max: 36.8 (10 Feb 2020 13:55)  T(F): 98.2 (10 Feb 2020 13:55), Max: 98.2 (10 Feb 2020 13:55)  HR: 85 (10 Feb 2020 13:55) (64 - 85)  BP: 152/67 (10 Feb 2020 13:55) (130/59 - 159/77)  BP(mean): --  RR: 16 (10 Feb 2020 13:55) (16 - 17)  SpO2: 100% (10 Feb 2020 13:55) (97% - 100%)  I&O's Summary    MEDICATIONS  (STANDING):  amLODIPine   Tablet 5 milliGRAM(s) Oral daily  aspirin  chewable 81 milliGRAM(s) Oral daily  atorvastatin 40 milliGRAM(s) Oral at bedtime  betamethasone valerate 0.1% Cream 1 Application(s) Topical two times a day  chlorhexidine 2% Cloths 1 Application(s) Topical <User Schedule>  famotidine    Tablet 20 milliGRAM(s) Oral two times a day  heparin  Injectable 5000 Unit(s) SubCutaneous every 12 hours  insulin glargine Injectable (LANTUS) 20 Unit(s) SubCutaneous at bedtime  insulin lispro (HumaLOG) corrective regimen sliding scale   SubCutaneous three times a day before meals  insulin lispro Injectable (HumaLOG) 8 Unit(s) SubCutaneous three times a day before meals  loratadine 10 milliGRAM(s) Oral daily  losartan 25 milliGRAM(s) Oral daily  mupirocin 2% Nasal 1 Application(s) Nasal two times a day  sodium chloride 0.9%. 1000 milliLiter(s) (80 mL/Hr) IV Continuous <Continuous>    MEDICATIONS  (PRN):  acetaminophen   Tablet .. 650 milliGRAM(s) Oral every 6 hours PRN Temp greater or equal to 38C (100.4F), Mild Pain (1 - 3)  calamine Lotion 1 Application(s) Topical every 6 hours PRN Itching  diphenhydrAMINE 25 milliGRAM(s) Oral every 6 hours PRN Rash and/or Itching    LABS:                        13.6   10.95 )-----------( 267      ( 10 Feb 2020 06:38 )             41.9     02-10    139  |  104  |  20<H>  ----------------------------<  197<H>  4.1   |  28  |  1.46<H>    Ca    9.3      10 Feb 2020 06:38          CAPILLARY BLOOD GLUCOSE      POCT Blood Glucose.: 342 mg/dL (10 Feb 2020 17:00)  POCT Blood Glucose.: 394 mg/dL (10 Feb 2020 12:50)  POCT Blood Glucose.: 407 mg/dL (10 Feb 2020 12:10)  POCT Blood Glucose.: 135 mg/dL (10 Feb 2020 08:31)  POCT Blood Glucose.: 327 mg/dL (09 Feb 2020 21:08)          REVIEW OF SYSTEMS:  CONSTITUTIONAL: No fever, weight loss, or fatigue  EYES: No eye pain, visual disturbances, or discharge  ENMT:  No difficulty hearing, tinnitus, vertigo; No sinus or throat pain  NECK: No pain or stiffness  RESPIRATORY: No cough, wheezing, chills or hemoptysis; No shortness of breath  CARDIOVASCULAR: No chest pain, palpitations, dizziness, or leg swelling  GASTROINTESTINAL: No abdominal or epigastric pain. No nausea, vomiting, or hematemesis; No diarrhea or constipation. No melena or hematochezia.  GENITOURINARY: No dysuria, frequency, hematuria, or incontinence  NEUROLOGICAL: No headaches, memory loss, loss of strength, numbness, or tremors      Consultant(s) Notes Reviewed:  [x ] YES  [ ] NO    PHYSICAL EXAM:  GENERAL: NAD, well-groomed, well-developed,not in any distress ,  HEAD:  Atraumatic, Normocephalic  EYES: EOMI, PERRLA, conjunctiva and sclera clear  ENMT: No tonsillar erythema, exudates, or enlargement; Moist mucous membranes, Good dentition, No lesions  NECK: Supple, No JVD, Normal thyroid  NERVOUS SYSTEM:  Alert & Oriented X3, No focal deficit   CHEST/LUNG: Good air entry bilateral with no  rales, rhonchi, wheezing, or rubs  HEART: Regular rate and rhythm; No murmurs, rubs, or gallops  ABDOMEN: Soft, Nontender, Nondistended; Bowel sounds present  EXTREMITIES:  2+ Peripheral Pulses, No clubbing, cyanosis, or edema    Care Discussed with Consultants/Other Providers [ x] YES  [ ] NO

## 2020-02-10 NOTE — PROGRESS NOTE ADULT - SUBJECTIVE AND OBJECTIVE BOX
Interval Events:  pt in nad    Allergies    No Known Allergies    Intolerances      Endocrine/Metabolic Medications:  atorvastatin 40 milliGRAM(s) Oral at bedtime  insulin glargine Injectable (LANTUS) 20 Unit(s) SubCutaneous at bedtime  insulin lispro (HumaLOG) corrective regimen sliding scale   SubCutaneous Before meals and at bedtime  insulin lispro Injectable (HumaLOG) 6 Unit(s) SubCutaneous three times a day with meals      Vital Signs Last 24 Hrs  T(C): 36.2 (10 Feb 2020 04:45), Max: 36.8 (09 Feb 2020 14:14)  T(F): 97.1 (10 Feb 2020 04:45), Max: 98.3 (09 Feb 2020 14:14)  HR: 64 (10 Feb 2020 04:45) (64 - 84)  BP: 159/77 (10 Feb 2020 04:45) (123/60 - 159/77)  BP(mean): --  RR: 16 (10 Feb 2020 04:45) (16 - 18)  SpO2: 100% (10 Feb 2020 04:45) (97% - 100%)      PHYSICAL EXAM  All physical exam findings normal, except those marked:  General:	Alert, active, cooperative, NAD, well hydrated  .		[] Abnormal:  Neck		Normal: supple, no cervical adenopathy, no palpable thyroid  .		[] Abnormal:  Cardiovascular	Normal: regular rate, normal S1, S2, no murmurs  .		[] Abnormal:  Respiratory	Normal: no chest wall deformity, normal respiratory pattern, CTA B/L  .		[] Abnormal:  Abdominal	Normal: soft, ND, NT, bowel sounds present, no masses, no organomegaly  .		[] Abnormal:  		Normal normal genitalia, testes descended, circumcised/uncircumcised  .		Zheng stage:			Breast zheng:  .		Menstrual history:  .		[] Abnormal:  Extremities	Normal: FROM x4  .		[] Abnormal:  Skin		Normal: intact and not indurated, no rash, no acanthosis nigricans  .		[] Abnormal:  Neurologic	Normal: grossly intact  .		[] Abnormal:    LABS                        13.6   10.95 )-----------( 267      ( 10 Feb 2020 06:38 )             41.9                               139    |  104    |  20                  Calcium: 9.3   / iCa: x      (02-10 @ 06:38)    ----------------------------<  197       Magnesium: x                                4.1     |  28     |  1.46             Phosphorous: x              Hemoglobin A1C, Whole Blood (02.09.20 @ 10:50)    Hemoglobin A1C, Whole Blood: >15.5:          CAPILLARY BLOOD GLUCOSE      POCT Blood Glucose.: 135 mg/dL (10 Feb 2020 08:31)  POCT Blood Glucose.: 327 mg/dL (09 Feb 2020 21:08)  POCT Blood Glucose.: 375 mg/dL (09 Feb 2020 17:08)  POCT Blood Glucose.: 295 mg/dL (09 Feb 2020 11:32)        Assesment/plan    Uncontrolled diabetes mellitus-hyperglycemic   Hba1c >15%  cont lantus to 20 units  increase humalog to 8 ac tid  moderate correction doses  fsg ac and hs  adjust insulin as steroids taper- last dose prednisone received today  pt will need out pt insulin tx.  to be d/w pts son

## 2020-02-10 NOTE — DISCHARGE NOTE PROVIDER - NSDCHHMENTAL_GEN_ALL_CORE
Pt. with uncontrolled diabetes with HgnA1C>15.5, needs monitoring of blood glucose and supervision of Insulin administration by family members.

## 2020-02-10 NOTE — PROGRESS NOTE ADULT - SUBJECTIVE AND OBJECTIVE BOX
HPI- 74M from home with PMH of DM, HTN BIBEMS for generalized itching of 5 days duration. He has never experienced similar symptoms. He denies taking any new medication, use any new soap, or bedsheets. no apparent s/sx of infections.   Pt was found to have YANNICK with uptending Cr. Evaluated by nephrology Dr. Huggins. continuing IVF.   s/p 3 days prednisone. started topical steroid and Loratadine, PRN benadryl for pruritis.   Endocrinology following for uncontrolled DM. increased  insulin regimen. Followed by nutritionist for education. PT evaluated for functioning. possible discharge home tomorrow.       INTERVAL HPI/OVERNIGHT EVENTS:  itching over the body.   # [id number] (Yarsanism)    MEDICATIONS  (STANDING):  amLODIPine   Tablet 5 milliGRAM(s) Oral daily  aspirin  chewable 81 milliGRAM(s) Oral daily  atorvastatin 40 milliGRAM(s) Oral at bedtime  betamethasone valerate 0.1% Cream 1 Application(s) Topical two times a day  chlorhexidine 2% Cloths 1 Application(s) Topical <User Schedule>  famotidine    Tablet 20 milliGRAM(s) Oral two times a day  heparin  Injectable 5000 Unit(s) SubCutaneous every 12 hours  insulin glargine Injectable (LANTUS) 20 Unit(s) SubCutaneous at bedtime  insulin lispro (HumaLOG) corrective regimen sliding scale   SubCutaneous Before meals and at bedtime  insulin lispro Injectable (HumaLOG) 8 Unit(s) SubCutaneous three times a day with meals  loratadine 10 milliGRAM(s) Oral daily  losartan 25 milliGRAM(s) Oral daily  mupirocin 2% Nasal 1 Application(s) Nasal two times a day  sodium chloride 0.9%. 1000 milliLiter(s) (80 mL/Hr) IV Continuous <Continuous>    MEDICATIONS  (PRN):  acetaminophen   Tablet .. 650 milliGRAM(s) Oral every 6 hours PRN Temp greater or equal to 38C (100.4F), Mild Pain (1 - 3)  calamine Lotion 1 Application(s) Topical every 6 hours PRN Itching  diphenhydrAMINE 25 milliGRAM(s) Oral every 6 hours PRN Rash and/or Itching      __________________________________________________  REVIEW OF SYSTEMS:    CONSTITUTIONAL: No fever, c/o itching  EYES: no acute visual disturbances  NECK: No pain or stiffness  RESPIRATORY: No cough; No shortness of breath  CARDIOVASCULAR: No chest pain, no palpitations  GASTROINTESTINAL: No pain. No nausea or vomiting; No diarrhea   NEUROLOGICAL: No headache or numbness, no tremors  MUSCULOSKELETAL: No joint pain, no muscle pain  Skin : dry. no rash.   GENITOURINARY: no dysuria, no frequency, no hesitancy  PSYCHIATRY: no depression , no anxiety  ALL OTHER  ROS negative        Vital Signs Last 24 Hrs  T(C): 36.2 (10 Feb 2020 04:45), Max: 36.8 (09 Feb 2020 14:14)  T(F): 97.1 (10 Feb 2020 04:45), Max: 98.3 (09 Feb 2020 14:14)  HR: 64 (10 Feb 2020 04:45) (64 - 84)  BP: 159/77 (10 Feb 2020 04:45) (123/60 - 159/77)  BP(mean): --  RR: 16 (10 Feb 2020 04:45) (16 - 18)  SpO2: 100% (10 Feb 2020 04:45) (97% - 100%)    ________________________________________________  PHYSICAL EXAM:  GENERAL: NAD. well groomed. conversant. Yarsanism speaking   HEENT: Normocephalic;  conjunctivae and sclerae clear; moist mucous membranes;   NECK : supple  CHEST/LUNG: Clear to auscultation bilaterally with good air entry   HEART: S1 S2  regular; no murmurs, gallops or rubs  ABDOMEN: Soft, Nontender, Nondistended; Bowel sounds present  EXTREMITIES: no cyanosis; no edema; no calf tenderness  SKIN: warm, no rash. extremely dry and scaly with scratch richard to back  NERVOUS SYSTEM:  Awake and alert; Oriented  to place, person.  no new deficits    _________________________________________________  LABS:                        13.6   10.95 )-----------( 267      ( 10 Feb 2020 06:38 )             41.9     02-10    139  |  104  |  20<H>  ----------------------------<  197<H>  4.1   |  28  |  1.46<H>    Ca    9.3      10 Feb 2020 06:38          CAPILLARY BLOOD GLUCOSE      POCT Blood Glucose.: 394 mg/dL (10 Feb 2020 12:50)  POCT Blood Glucose.: 407 mg/dL (10 Feb 2020 12:10)  POCT Blood Glucose.: 135 mg/dL (10 Feb 2020 08:31)  POCT Blood Glucose.: 327 mg/dL (09 Feb 2020 21:08)  POCT Blood Glucose.: 375 mg/dL (09 Feb 2020 17:08)        MRSA PCR Result.: NotDetec: MRSA/MSSA PCR assay is a qualitative in vitro diagnostic test for the  direct detection and differentiation of methicillin-resistant  Staphylococcus aureus (MRSA) from Staphylococcus aureus (SA). The assay  detects DNA from nasal swabs in patients atrisk for nasal colonization.  It is not intended to diagnose MRSA or SA infections nor guide or monitor  treatment for MRSA/SA infections. A negative result does not preclude  nasal colonization. The assay is FDA-approved and its performance has  been established by [facility], [employer] and the [facility], [place], [place]. (02.08.20 @ 22:02)    RADIOLOGY & ADDITIONAL TESTS:    Imaging  Reviewed:  YES    < from: Xray Chest 1 View-PORTABLE IMMEDIATE (02.07.20 @ 19:13) >    EXAM:  XR CHEST PORTABLE IMMED 1V                            PROCEDURE DATE:  02/07/2020          INTERPRETATION:  AP semierect chest on February 7, 2020 6:58 PM. Patient has generalized itching for 5 days.    COMPARISON: None available. Heart is magnified by technique.    Minimal blunting of the right base laterally is noted.    IMPRESSION: As above.                RALPH BOYER M.D., ATTENDING RADIOLOGIST  This document has been electronically signed. Feb 7 2020  7:17PM                < end of copied text >        Consultant(s) Notes Reviewed:   YES  ID. Endocrinology. Nephrology      Plan of care was discussed with patient   all questions and concerns were addressed

## 2020-02-10 NOTE — DISCHARGE NOTE PROVIDER - HOSPITAL COURSE
74M from home with PMH of DM, HTN BIBEMS for generalized itching of 5 days duration. He has never experienced similar symptoms. He denies taking any new medication, use any new soap, or bedsheets. no apparent s/sx of infections.     Pt was found to have YANNICK with uptending Cr. Evaluated by nephrology Dr. Huggins. continuing IVF.     s/p 3 days prednisone. started topical steroid and Loratadine, PRN benadryl for pruritis.     Endocrinology following for uncontrolled DM. increased  insulin regimen. Followed by nutritionist for education. PT evaluated for functioning. possible discharge home tomorrow.         ---to be completed 74M from home with PMH of DM, HTN BIBEMS for generalized itching of 5 days duration. He has never experienced similar symptoms. He denies taking any new medication, use any new soap, or bedsheets. no apparent s/sx of infections.     Pt was found to have YANNICK with uptending Cr. Evaluated by nephrology Dr. Huggins. continuing IVF.     s/p 3 days prednisone. started topical steroid and Loratadine, PRN benadryl for pruritis.     Pt. noted with uncontrolled DM with HgnA1C 15.5, endo Dr. martin following , Insulin regimen adjusted per pt.'s requirements.     Followed by nutritionist for education. PT evaluated for functioning.    Pt. lives with son and daughter in law.  Son Tung Galvan [phone] states he and his wife are well familiar with Insulin administration and will follow prescribed regimen.    Pt. is medically stable for discharge to home.

## 2020-02-10 NOTE — DISCHARGE NOTE PROVIDER - PROVIDER TOKENS
PROVIDER:[TOKEN:[[id number]],FOLLOWUP:[1-3 days]],FREE:[LAST:[deja],FIRST:[shonda],PHONE:[phone]],FAX:[(   )    -],FOLLOWUP:[1 week]]

## 2020-02-10 NOTE — DISCHARGE NOTE PROVIDER - NSDCCPCAREPLAN_GEN_ALL_CORE_FT
PRINCIPAL DISCHARGE DIAGNOSIS  Diagnosis: Pruritic rash  Assessment and Plan of Treatment: You were monitored for pruritic rash likely due to dry and scaly skin.   continue topical cream and oral agent as prescribed.  Maintain good skin hygiene.   Use moistrizer cream after taking shower or bath.   Follow up with dermatology after discharge.      SECONDARY DISCHARGE DIAGNOSES  Diagnosis: Uncontrolled diabetes mellitus  Assessment and Plan of Treatment: Your diabetes are not controlled during hospital stay. You have been treated with oral steroid for pruritis. Not off steroid.  Conotinue insulin as prescribed.   Check your blood glucose before meals and bedtime.   Eat evening snack with night time insulin  Maintain oral fluid intake.   Follow up [facility] endocrinology after discharge.    Diagnosis: Essential hypertension  Assessment and Plan of Treatment: Continue medications as prescribed.   monitor Blood pressure daily.   Follow up with your primary doctor. PRINCIPAL DISCHARGE DIAGNOSIS  Diagnosis: Pruritic rash  Assessment and Plan of Treatment: You were monitored for pruritic rash likely due uncontrolled diabetes and to dry and scaly skin.   Diabetes management  continue topical cream and oral agent as prescribed.  Maintain good skin hygiene.   Use moistrizer cream after taking shower or bath.   Follow up with dermatology after discharge.      SECONDARY DISCHARGE DIAGNOSES  Diagnosis: Essential hypertension  Assessment and Plan of Treatment: Continue medications as prescribed.   monitor Blood pressure daily.   Follow up with your primary doctor.    Diagnosis: Uncontrolled diabetes mellitus  Assessment and Plan of Treatment: You were admitted with uncontrolled diabetes with hemoglobin A1C 15.5  You were followed by endocrinologist Dr. Ji who adjusted your Insulin per your requirements.  Your son Tung reported that he and his wife are well familiar with Insulin administration  -Please administer Insulin as prescribed  -Please follow up with endocrinologist Dr. Ji for further management of your diabetes.  -Uncontrolled diabetes may potentially lead to severe complications such as heart disease, kidney disease, neuropathy, stroke etc..

## 2020-02-10 NOTE — DISCHARGE NOTE PROVIDER - NSDCMRMEDTOKEN_GEN_ALL_CORE_FT
amLODIPine 5 mg oral tablet: 1 tab(s) orally once a day  Lipitor 40 mg oral tablet: 1 tab(s) orally once a day  losartan 25 mg oral tablet: 1 tab(s) orally once a day  metFORMIN 1000 mg oral tablet, extended release: 1 tab(s) orally once a day  repaglinide 0.5 mg oral tablet: amLODIPine 5 mg oral tablet: 1 tab(s) orally once a day  aspirin 81 mg oral tablet, chewable: 1 tab(s) orally once a day  betamethasone valerate 0.1% topical cream: 1 application topically 2 times a day  calamine topical lotion: 1 application topically every 6 hours, As needed, Itching  famotidine 20 mg oral tablet: 1 tab(s) orally 2 times a day  insulin lispro (concentrated) 200 units/mL subcutaneous solution: 8 unit(s) subcutaneous 3 times a day (before meals)   Lantus Solostar Pen 100 units/mL subcutaneous solution: 20 unit(s) subcutaneous once a day (at bedtime)   Lipitor 40 mg oral tablet: 1 tab(s) orally once a day  loratadine 10 mg oral tablet: 1 tab(s) orally once a day  losartan 25 mg oral tablet: 1 tab(s) orally once a day

## 2020-02-10 NOTE — PROGRESS NOTE ADULT - SUBJECTIVE AND OBJECTIVE BOX
[facility] [place] [facility]   MD RUKHSANA CERVANTES MD RUORU WONG, PA    TEL:  OFFICE: [phone]  DR HUDDLESTON CELL: [phone]  MISTI DERAS CELL: [phone]  DR. RUVALCABA CELL: [phone]  DR. RAE CELL: [phone]    FROM 5 PM - 7 AM PLEASE CALL ANSWERING SERVICE: [phone]    RENAL FOLLOW UP NOTE  --------------------------------------------------------------------------------  HPI:      Pt seen and examined at bedside.   Denies SOB, chest pain     PAST HISTORY  --------------------------------------------------------------------------------  No significant changes to PMH, PSH, FHx, SHx, unless otherwise noted    ALLERGIES & MEDICATIONS  --------------------------------------------------------------------------------  Allergies    No Known Allergies    Intolerances      Standing Inpatient Medications  amLODIPine   Tablet 5 milliGRAM(s) Oral daily  aspirin  chewable 81 milliGRAM(s) Oral daily  atorvastatin 40 milliGRAM(s) Oral at bedtime  betamethasone valerate 0.1% Cream 1 Application(s) Topical two times a day  famotidine    Tablet 20 milliGRAM(s) Oral two times a day  heparin  Injectable 5000 Unit(s) SubCutaneous every 12 hours  insulin glargine Injectable (LANTUS) 20 Unit(s) SubCutaneous at bedtime  insulin lispro (HumaLOG) corrective regimen sliding scale   SubCutaneous Before meals and at bedtime  insulin lispro Injectable (HumaLOG) 8 Unit(s) SubCutaneous three times a day with meals  loratadine 10 milliGRAM(s) Oral daily  losartan 25 milliGRAM(s) Oral daily  sodium chloride 0.9%. 1000 milliLiter(s) IV Continuous <Continuous>    PRN Inpatient Medications  acetaminophen   Tablet .. 650 milliGRAM(s) Oral every 6 hours PRN  calamine Lotion 1 Application(s) Topical every 6 hours PRN  diphenhydrAMINE 25 milliGRAM(s) Oral every 6 hours PRN      REVIEW OF SYSTEMS  --------------------------------------------------------------------------------  General: no fever  CVS: no chest pain  RESP: no sob, no cough  ABD: no abdominal pain  : no dysuria,  MSK: no edema     VITALS/PHYSICAL EXAM  --------------------------------------------------------------------------------  T(C): 36.2 (02-10-20 @ 04:45), Max: 36.8 (02-09-20 @ 14:14)  HR: 64 (02-10-20 @ 04:45) (64 - 84)  BP: 159/77 (02-10-20 @ 04:45) (123/60 - 159/77)  RR: 16 (02-10-20 @ 04:45) (16 - 18)  SpO2: 100% (02-10-20 @ 04:45) (97% - 100%)  Wt(kg): --        Physical Exam:  	Gen: NAD  	HEENT: MMM  	Pulm: CTA B/L  	CV: S1S2  	Abd: Soft, +BS  	Ext: No LE edema B/L                      Neuro: Awake alert  	Skin: Warm and Dry   	 no echo    LABS/STUDIES  --------------------------------------------------------------------------------              13.6   10.95 >-----------<  267      [02-10-20 @ 06:38]              41.9     139  |  104  |  20  ----------------------------<  197      [02-10-20 @ 06:38]  4.1   |  28  |  1.46        Ca     9.3     [02-10-20 @ 06:38]            Creatinine Trend:  SCr 1.46 [02-10 @ 06:38]  SCr 1.37 [02-09 @ 07:03]  SCr 1.44 [02-08 @ 06:23]  SCr 1.58 [02-07 @ 21:04]    Urinalysis - [02-07-20 @ 23:59]      Color Yellow / Appearance Clear / SG 1.010 / pH 5.0      Gluc 1000 / Ketone Negative  / Bili Negative / Urobili Negative       Blood Negative / Protein 100 / Leuk Est Negative / Nitrite Negative      RBC 0-2 / WBC 0-2 / Hyaline 0-2 / Gran  / Sq Epi  / Non Sq Epi Few / Bacteria Moderate    Urine Creatinine 42      [02-08-20 @ 09:21]  Urine Sodium 80      [02-08-20 @ 09:21]  Urine Urea Nitrogen 210      [02-08-20 @ 17:37]  Urine Osmolality 355      [02-08-20 @ 09:21]    Vitamin D (25OH) 33.7      [02-08-20 @ 11:46]  HbA1c >15.5      [02-09-20 @ 10:50]  TSH 2.26      [02-08-20 @ 06:23]  Lipid: chol 139, TG 81, HDL 56, LDL 67      [02-08-20 @ 06:23]

## 2020-02-11 VITALS
SYSTOLIC BLOOD PRESSURE: 143 MMHG | RESPIRATION RATE: 16 BRPM | DIASTOLIC BLOOD PRESSURE: 63 MMHG | OXYGEN SATURATION: 98 % | TEMPERATURE: 98 F | HEART RATE: 67 BPM

## 2020-02-11 LAB
ANION GAP SERPL CALC-SCNC: 4 MMOL/L — LOW (ref 5–17)
BUN SERPL-MCNC: 18 MG/DL — SIGNIFICANT CHANGE UP (ref 7–18)
CALCIUM SERPL-MCNC: 8.8 MG/DL — SIGNIFICANT CHANGE UP (ref 8.4–10.5)
CHLORIDE SERPL-SCNC: 101 MMOL/L — SIGNIFICANT CHANGE UP (ref 96–108)
CO2 SERPL-SCNC: 30 MMOL/L — SIGNIFICANT CHANGE UP (ref 22–31)
CREAT SERPL-MCNC: 1.32 MG/DL — HIGH (ref 0.5–1.3)
GLUCOSE BLDC GLUCOMTR-MCNC: 187 MG/DL — HIGH (ref 70–99)
GLUCOSE BLDC GLUCOMTR-MCNC: 191 MG/DL — HIGH (ref 70–99)
GLUCOSE BLDC GLUCOMTR-MCNC: 217 MG/DL — HIGH (ref 70–99)
GLUCOSE BLDC GLUCOMTR-MCNC: 249 MG/DL — HIGH (ref 70–99)
GLUCOSE BLDC GLUCOMTR-MCNC: 279 MG/DL — HIGH (ref 70–99)
GLUCOSE SERPL-MCNC: 235 MG/DL — HIGH (ref 70–99)
HCT VFR BLD CALC: 42.1 % — SIGNIFICANT CHANGE UP (ref 39–50)
HGB BLD-MCNC: 13.8 G/DL — SIGNIFICANT CHANGE UP (ref 13–17)
MCHC RBC-ENTMCNC: 30.1 PG — SIGNIFICANT CHANGE UP (ref 27–34)
MCHC RBC-ENTMCNC: 32.8 GM/DL — SIGNIFICANT CHANGE UP (ref 32–36)
MCV RBC AUTO: 91.9 FL — SIGNIFICANT CHANGE UP (ref 80–100)
NRBC # BLD: 0 /100 WBCS — SIGNIFICANT CHANGE UP (ref 0–0)
PLATELET # BLD AUTO: 276 K/UL — SIGNIFICANT CHANGE UP (ref 150–400)
POTASSIUM SERPL-MCNC: 4.2 MMOL/L — SIGNIFICANT CHANGE UP (ref 3.5–5.3)
POTASSIUM SERPL-SCNC: 4.2 MMOL/L — SIGNIFICANT CHANGE UP (ref 3.5–5.3)
RBC # BLD: 4.58 M/UL — SIGNIFICANT CHANGE UP (ref 4.2–5.8)
RBC # FLD: 14.7 % — HIGH (ref 10.3–14.5)
SODIUM SERPL-SCNC: 135 MMOL/L — SIGNIFICANT CHANGE UP (ref 135–145)
WBC # BLD: 9.71 K/UL — SIGNIFICANT CHANGE UP (ref 3.8–10.5)
WBC # FLD AUTO: 9.71 K/UL — SIGNIFICANT CHANGE UP (ref 3.8–10.5)

## 2020-02-11 PROCEDURE — 85027 COMPLETE CBC AUTOMATED: CPT

## 2020-02-11 PROCEDURE — 80053 COMPREHEN METABOLIC PANEL: CPT

## 2020-02-11 PROCEDURE — 87641 MR-STAPH DNA AMP PROBE: CPT

## 2020-02-11 PROCEDURE — 82010 KETONE BODYS QUAN: CPT

## 2020-02-11 PROCEDURE — 84300 ASSAY OF URINE SODIUM: CPT

## 2020-02-11 PROCEDURE — 82962 GLUCOSE BLOOD TEST: CPT

## 2020-02-11 PROCEDURE — 87640 STAPH A DNA AMP PROBE: CPT

## 2020-02-11 PROCEDURE — 81001 URINALYSIS AUTO W/SCOPE: CPT

## 2020-02-11 PROCEDURE — 82607 VITAMIN B-12: CPT

## 2020-02-11 PROCEDURE — 83935 ASSAY OF URINE OSMOLALITY: CPT

## 2020-02-11 PROCEDURE — 84100 ASSAY OF PHOSPHORUS: CPT

## 2020-02-11 PROCEDURE — 84540 ASSAY OF URINE/UREA-N: CPT

## 2020-02-11 PROCEDURE — 76775 US EXAM ABDO BACK WALL LIM: CPT

## 2020-02-11 PROCEDURE — 36415 COLL VENOUS BLD VENIPUNCTURE: CPT

## 2020-02-11 PROCEDURE — 82306 VITAMIN D 25 HYDROXY: CPT

## 2020-02-11 PROCEDURE — 82570 ASSAY OF URINE CREATININE: CPT

## 2020-02-11 PROCEDURE — 86140 C-REACTIVE PROTEIN: CPT

## 2020-02-11 PROCEDURE — 93005 ELECTROCARDIOGRAM TRACING: CPT

## 2020-02-11 PROCEDURE — 71045 X-RAY EXAM CHEST 1 VIEW: CPT

## 2020-02-11 PROCEDURE — 84484 ASSAY OF TROPONIN QUANT: CPT

## 2020-02-11 PROCEDURE — 83880 ASSAY OF NATRIURETIC PEPTIDE: CPT

## 2020-02-11 PROCEDURE — 97161 PT EVAL LOW COMPLEX 20 MIN: CPT

## 2020-02-11 PROCEDURE — 84156 ASSAY OF PROTEIN URINE: CPT

## 2020-02-11 PROCEDURE — 84443 ASSAY THYROID STIM HORMONE: CPT

## 2020-02-11 PROCEDURE — 80061 LIPID PANEL: CPT

## 2020-02-11 PROCEDURE — 80048 BASIC METABOLIC PNL TOTAL CA: CPT

## 2020-02-11 PROCEDURE — 83036 HEMOGLOBIN GLYCOSYLATED A1C: CPT

## 2020-02-11 PROCEDURE — 82746 ASSAY OF FOLIC ACID SERUM: CPT

## 2020-02-11 PROCEDURE — 99285 EMERGENCY DEPT VISIT HI MDM: CPT | Mod: 25

## 2020-02-11 PROCEDURE — 83735 ASSAY OF MAGNESIUM: CPT

## 2020-02-11 PROCEDURE — 85652 RBC SED RATE AUTOMATED: CPT

## 2020-02-11 PROCEDURE — 82803 BLOOD GASES ANY COMBINATION: CPT

## 2020-02-11 RX ORDER — LORATADINE 10 MG/1
1 TABLET ORAL
Qty: 10 | Refills: 0
Start: 2020-02-11 | End: 2020-02-20

## 2020-02-11 RX ORDER — LINEZOLID 600 MG/300ML
600 INJECTION, SOLUTION INTRAVENOUS EVERY 12 HOURS
Refills: 0 | Status: DISCONTINUED | OUTPATIENT
Start: 2020-02-11 | End: 2020-02-12

## 2020-02-11 RX ORDER — INSULIN LISPRO 100/ML
8 VIAL (ML) SUBCUTANEOUS
Qty: 2 | Refills: 0
Start: 2020-02-11 | End: 2020-03-11

## 2020-02-11 RX ORDER — CALAMINE AND ZINC OXIDE AND PHENOL 160; 10 MG/ML; MG/ML
1 LOTION TOPICAL
Qty: 2 | Refills: 0
Start: 2020-02-11 | End: 2020-03-11

## 2020-02-11 RX ORDER — ASPIRIN/CALCIUM CARB/MAGNESIUM 324 MG
1 TABLET ORAL
Qty: 30 | Refills: 0
Start: 2020-02-11 | End: 2020-03-11

## 2020-02-11 RX ORDER — FAMOTIDINE 10 MG/ML
1 INJECTION INTRAVENOUS
Qty: 60 | Refills: 0
Start: 2020-02-11 | End: 2020-03-11

## 2020-02-11 RX ORDER — ENOXAPARIN SODIUM 100 MG/ML
20 INJECTION SUBCUTANEOUS
Qty: 2 | Refills: 0
Start: 2020-02-11

## 2020-02-11 RX ADMIN — HEPARIN SODIUM 5000 UNIT(S): 5000 INJECTION INTRAVENOUS; SUBCUTANEOUS at 05:31

## 2020-02-11 RX ADMIN — CHLORHEXIDINE GLUCONATE 1 APPLICATION(S): 213 SOLUTION TOPICAL at 06:21

## 2020-02-11 RX ADMIN — Medication 8 UNIT(S): at 08:28

## 2020-02-11 RX ADMIN — LINEZOLID 600 MILLIGRAM(S): 600 INJECTION, SOLUTION INTRAVENOUS at 17:30

## 2020-02-11 RX ADMIN — FAMOTIDINE 20 MILLIGRAM(S): 10 INJECTION INTRAVENOUS at 05:31

## 2020-02-11 RX ADMIN — MUPIROCIN 1 APPLICATION(S): 20 OINTMENT TOPICAL at 05:31

## 2020-02-11 RX ADMIN — Medication 2: at 17:27

## 2020-02-11 RX ADMIN — MUPIROCIN 1 APPLICATION(S): 20 OINTMENT TOPICAL at 17:29

## 2020-02-11 RX ADMIN — Medication 2: at 12:04

## 2020-02-11 RX ADMIN — LORATADINE 10 MILLIGRAM(S): 10 TABLET ORAL at 12:04

## 2020-02-11 RX ADMIN — CALAMINE AND ZINC OXIDE AND PHENOL 1 APPLICATION(S): 160; 10 LOTION TOPICAL at 05:32

## 2020-02-11 RX ADMIN — Medication 1 APPLICATION(S): at 05:31

## 2020-02-11 RX ADMIN — HEPARIN SODIUM 5000 UNIT(S): 5000 INJECTION INTRAVENOUS; SUBCUTANEOUS at 17:29

## 2020-02-11 RX ADMIN — Medication 81 MILLIGRAM(S): at 12:05

## 2020-02-11 RX ADMIN — Medication 8 UNIT(S): at 17:28

## 2020-02-11 RX ADMIN — FAMOTIDINE 20 MILLIGRAM(S): 10 INJECTION INTRAVENOUS at 17:29

## 2020-02-11 RX ADMIN — Medication 1 APPLICATION(S): at 17:30

## 2020-02-11 RX ADMIN — Medication 8 UNIT(S): at 12:04

## 2020-02-11 RX ADMIN — Medication 4: at 08:28

## 2020-02-11 RX ADMIN — AMLODIPINE BESYLATE 5 MILLIGRAM(S): 2.5 TABLET ORAL at 05:31

## 2020-02-11 RX ADMIN — LOSARTAN POTASSIUM 25 MILLIGRAM(S): 100 TABLET, FILM COATED ORAL at 05:31

## 2020-02-11 NOTE — PROGRESS NOTE ADULT - SUBJECTIVE AND OBJECTIVE BOX
NP Note discussed with  Primary Attending    Patient is a 74y old  Male who presents with a chief complaint of Itching (11 Feb 2020 11:20)      INTERVAL HPI/OVERNIGHT EVENTS: no new complaints    MEDICATIONS  (STANDING):  amLODIPine   Tablet 5 milliGRAM(s) Oral daily  aspirin  chewable 81 milliGRAM(s) Oral daily  atorvastatin 40 milliGRAM(s) Oral at bedtime  betamethasone valerate 0.1% Cream 1 Application(s) Topical two times a day  chlorhexidine 2% Cloths 1 Application(s) Topical <User Schedule>  famotidine    Tablet 20 milliGRAM(s) Oral two times a day  heparin  Injectable 5000 Unit(s) SubCutaneous every 12 hours  insulin glargine Injectable (LANTUS) 20 Unit(s) SubCutaneous at bedtime  insulin lispro (HumaLOG) corrective regimen sliding scale   SubCutaneous three times a day before meals  insulin lispro Injectable (HumaLOG) 8 Unit(s) SubCutaneous three times a day before meals  linezolid    Tablet 600 milliGRAM(s) Oral every 12 hours  loratadine 10 milliGRAM(s) Oral daily  losartan 25 milliGRAM(s) Oral daily  mupirocin 2% Nasal 1 Application(s) Nasal two times a day  sodium chloride 0.9%. 1000 milliLiter(s) (80 mL/Hr) IV Continuous <Continuous>    MEDICATIONS  (PRN):  acetaminophen   Tablet .. 650 milliGRAM(s) Oral every 6 hours PRN Temp greater or equal to 38C (100.4F), Mild Pain (1 - 3)  calamine Lotion 1 Application(s) Topical every 6 hours PRN Itching  diphenhydrAMINE 25 milliGRAM(s) Oral every 6 hours PRN Rash and/or Itching      __________________________________________________  REVIEW OF SYSTEMS: limited due to AMS    CONSTITUTIONAL: No fever,   EYES: no acute visual disturbances  NECK: No pain or stiffness  RESPIRATORY: No cough; No shortness of breath  CARDIOVASCULAR: No chest pain, no palpitations  GASTROINTESTINAL: No pain. No nausea or vomiting; No diarrhea   NEUROLOGICAL: No headache or numbness, no tremors  MUSCULOSKELETAL: No joint pain, no muscle pain  GENITOURINARY: no dysuria, no frequency, no hesitancy  PSYCHIATRY: no depression , no anxiety  ALL OTHER  ROS negative        Vital Signs Last 24 Hrs  T(C): 36.8 (11 Feb 2020 13:22), Max: 36.8 (11 Feb 2020 13:22)  T(F): 98.2 (11 Feb 2020 13:22), Max: 98.2 (11 Feb 2020 13:22)  HR: 67 (11 Feb 2020 13:22) (64 - 75)  BP: 143/63 (11 Feb 2020 13:22) (143/63 - 167/70)  BP(mean): --  RR: 16 (11 Feb 2020 13:22) (16 - 18)  SpO2: 98% (11 Feb 2020 13:22) (97% - 99%)    ________________________________________________  PHYSICAL EXAM:  GENERAL: NAD  HEENT: Normocephalic;  conjunctivae and sclerae clear; moist mucous membranes;   NECK : supple  CHEST/LUNG: Clear to auscultation bilaterally with good air entry   HEART: S1 S2  regular; no murmurs, gallops or rubs  ABDOMEN: Soft, Nontender, Nondistended; Bowel sounds present  EXTREMITIES: no cyanosis; no edema; no calf tenderness  SKIN: warm and dry; no rash  NERVOUS SYSTEM:  Awake and alert; Oriented  to place, person and time ; no new deficits    _________________________________________________  LABS:                        13.8   9.71  )-----------( 276      ( 11 Feb 2020 07:19 )             42.1     02-11    135  |  101  |  18  ----------------------------<  235<H>  4.2   |  30  |  1.32<H>    Ca    8.8      11 Feb 2020 07:19          CAPILLARY BLOOD GLUCOSE      POCT Blood Glucose.: 191 mg/dL (11 Feb 2020 11:45)  POCT Blood Glucose.: 217 mg/dL (11 Feb 2020 08:17)  POCT Blood Glucose.: 298 mg/dL (10 Feb 2020 21:29)  POCT Blood Glucose.: 342 mg/dL (10 Feb 2020 17:00)        RADIOLOGY & ADDITIONAL TESTS:    Imaging Personally Reviewed:  YES/NO    Consultant(s) Notes Reviewed:   YES/ No    Care Discussed with Consultants :     Plan of care was discussed with patient and /or primary care giver; all questions and concerns were addressed and care was aligned with patient's wishes. NP Note discussed with  Primary Attending    Patient is a 74y old  Male who presents with a chief complaint of Itching (11 Feb 2020 11:20)      INTERVAL HPI/OVERNIGHT EVENTS: no new complaints    MEDICATIONS  (STANDING):  amLODIPine   Tablet 5 milliGRAM(s) Oral daily  aspirin  chewable 81 milliGRAM(s) Oral daily  atorvastatin 40 milliGRAM(s) Oral at bedtime  betamethasone valerate 0.1% Cream 1 Application(s) Topical two times a day  chlorhexidine 2% Cloths 1 Application(s) Topical <User Schedule>  famotidine    Tablet 20 milliGRAM(s) Oral two times a day  heparin  Injectable 5000 Unit(s) SubCutaneous every 12 hours  insulin glargine Injectable (LANTUS) 20 Unit(s) SubCutaneous at bedtime  insulin lispro (HumaLOG) corrective regimen sliding scale   SubCutaneous three times a day before meals  insulin lispro Injectable (HumaLOG) 8 Unit(s) SubCutaneous three times a day before meals  linezolid    Tablet 600 milliGRAM(s) Oral every 12 hours  loratadine 10 milliGRAM(s) Oral daily  losartan 25 milliGRAM(s) Oral daily  mupirocin 2% Nasal 1 Application(s) Nasal two times a day  sodium chloride 0.9%. 1000 milliLiter(s) (80 mL/Hr) IV Continuous <Continuous>    MEDICATIONS  (PRN):  acetaminophen   Tablet .. 650 milliGRAM(s) Oral every 6 hours PRN Temp greater or equal to 38C (100.4F), Mild Pain (1 - 3)  calamine Lotion 1 Application(s) Topical every 6 hours PRN Itching  diphenhydrAMINE 25 milliGRAM(s) Oral every 6 hours PRN Rash and/or Itching      __________________________________________________  REVIEW OF SYSTEMS: limited due to AMS    CONSTITUTIONAL: No fever,   EYES: no acute visual disturbances  NECK: No pain or stiffness  RESPIRATORY: No cough; No shortness of breath  CARDIOVASCULAR: No chest pain, no palpitations  GASTROINTESTINAL: No pain. No nausea or vomiting; No diarrhea   NEUROLOGICAL: No headache or numbness, no tremors  MUSCULOSKELETAL: No joint pain, no muscle pain  GENITOURINARY: no dysuria, no frequency, no hesitancy  PSYCHIATRY: no depression , no anxiety  ALL OTHER  ROS negative        Vital Signs Last 24 Hrs  T(C): 36.8 (11 Feb 2020 13:22), Max: 36.8 (11 Feb 2020 13:22)  T(F): 98.2 (11 Feb 2020 13:22), Max: 98.2 (11 Feb 2020 13:22)  HR: 67 (11 Feb 2020 13:22) (64 - 75)  BP: 143/63 (11 Feb 2020 13:22) (143/63 - 167/70)  BP(mean): --  RR: 16 (11 Feb 2020 13:22) (16 - 18)  SpO2: 98% (11 Feb 2020 13:22) (97% - 99%)    ________________________________________________  PHYSICAL EXAM:  GENERAL: NAD  HEENT: Normocephalic;  conjunctivae and sclerae clear; moist mucous membranes;   NECK : supple  CHEST/LUNG: Clear to auscultation bilaterally with good air entry   HEART: S1 S2  regular; no murmurs, gallops or rubs  ABDOMEN: Soft, Nontender, Nondistended; Bowel sounds present  EXTREMITIES: no cyanosis; no edema; no calf tenderness  SKIN: Dry scaly skin patches. Scratch marks noted over torso arms and legs.  NERVOUS SYSTEM:  Awake and alert; Orientated X1 Moving all extremities. Follows some commands.    _________________________________________________  LABS:                        13.8   9.71  )-----------( 276      ( 11 Feb 2020 07:19 )             42.1     02-11    135  |  101  |  18  ----------------------------<  235<H>  4.2   |  30  |  1.32<H>    Ca    8.8      11 Feb 2020 07:19          CAPILLARY BLOOD GLUCOSE      POCT Blood Glucose.: 191 mg/dL (11 Feb 2020 11:45)  POCT Blood Glucose.: 217 mg/dL (11 Feb 2020 08:17)  POCT Blood Glucose.: 298 mg/dL (10 Feb 2020 21:29)  POCT Blood Glucose.: 342 mg/dL (10 Feb 2020 17:00)        RADIOLOGY & ADDITIONAL TESTS:    Imaging Personally Reviewed:  YES  < from: Xray Chest 1 View-PORTABLE IMMEDIATE (02.07.20 @ 19:13) >  INTERPRETATION:  AP semierect chest on February 7, 2020 6:58 PM. Patient has generalized itching for 5 days.    COMPARISON: None available. Heart is magnified by technique.    Minimal blunting of the right base laterally is noted.    < end of copied text >  < from: US Renal (02.09.20 @ 13:23) >  Real-time ultrasonography of the kidneys was performed, static images were submitted for evaluation. Examination is limited by patient's inability to cooperate, bladder/prostate not imaged. There are no previous imaging studies available for comparison    Kidneys: No hydronephrosis nephrolithiasis or gross mass bilaterally. Increased corticalechogenicity consistent with history of medical renal disease. Normal blood flow by Doppler evaluation.    Right kidney: 9.4 x 4.7 x 4.5 cm with a volume of 104 cc.    Left kidney 9.5 x 4.2 x 4.6 cm with a volume of 98 cc and a well-defined anechoic 4.5 cm exophytic structure at the midpole with no vascularity on Doppler evaluation consistent with a simple cyst.    Impression    No acute findings, increased cortical echogenicity consistent with history of medical renal disease. No prior studies available to assess chronicity      < end of copied text >      Consultant(s) Notes Reviewed:   YES    Care Discussed with Consultants :     Plan of care was discussed with patient and /or primary care giver; all questions and concerns were addressed and care was aligned with patient's wishes.

## 2020-02-11 NOTE — PROGRESS NOTE ADULT - PROBLEM SELECTOR PLAN 6
came from home  possible d/c tomorrow after diabetic education. unknown  home support system
DVT prophylaxis.  Discharge planning underway.  Family needs teaching.
none

## 2020-02-11 NOTE — PROGRESS NOTE ADULT - PROBLEM SELECTOR PROBLEM 1
Uncontrolled type 2 diabetes mellitus with hyperglycemia
Uncontrolled type 2 diabetes mellitus with hyperglycemia

## 2020-02-11 NOTE — PROGRESS NOTE ADULT - SUBJECTIVE AND OBJECTIVE BOX
INTERVAL HPI/OVERNIGHT EVENTS: no new concerns. I want to eat meat so go home.   Vital Signs Last 24 Hrs  T(C): 36.8 (11 Feb 2020 13:22), Max: 36.8 (11 Feb 2020 13:22)  T(F): 98.2 (11 Feb 2020 13:22), Max: 98.2 (11 Feb 2020 13:22)  HR: 67 (11 Feb 2020 13:22) (64 - 75)  BP: 143/63 (11 Feb 2020 13:22) (143/63 - 167/70)  BP(mean): --  RR: 16 (11 Feb 2020 13:22) (16 - 18)  SpO2: 98% (11 Feb 2020 13:22) (97% - 99%)  I&O's Summary    MEDICATIONS  (STANDING):  amLODIPine   Tablet 5 milliGRAM(s) Oral daily  aspirin  chewable 81 milliGRAM(s) Oral daily  atorvastatin 40 milliGRAM(s) Oral at bedtime  betamethasone valerate 0.1% Cream 1 Application(s) Topical two times a day  chlorhexidine 2% Cloths 1 Application(s) Topical <User Schedule>  famotidine    Tablet 20 milliGRAM(s) Oral two times a day  heparin  Injectable 5000 Unit(s) SubCutaneous every 12 hours  insulin glargine Injectable (LANTUS) 20 Unit(s) SubCutaneous at bedtime  insulin lispro (HumaLOG) corrective regimen sliding scale   SubCutaneous three times a day before meals  insulin lispro Injectable (HumaLOG) 8 Unit(s) SubCutaneous three times a day before meals  linezolid    Tablet 600 milliGRAM(s) Oral every 12 hours  loratadine 10 milliGRAM(s) Oral daily  losartan 25 milliGRAM(s) Oral daily  mupirocin 2% Nasal 1 Application(s) Nasal two times a day  sodium chloride 0.9%. 1000 milliLiter(s) (80 mL/Hr) IV Continuous <Continuous>    MEDICATIONS  (PRN):  acetaminophen   Tablet .. 650 milliGRAM(s) Oral every 6 hours PRN Temp greater or equal to 38C (100.4F), Mild Pain (1 - 3)  calamine Lotion 1 Application(s) Topical every 6 hours PRN Itching  diphenhydrAMINE 25 milliGRAM(s) Oral every 6 hours PRN Rash and/or Itching    LABS:                        13.8   9.71  )-----------( 276      ( 11 Feb 2020 07:19 )             42.1     02-11    135  |  101  |  18  ----------------------------<  235<H>  4.2   |  30  |  1.32<H>    Ca    8.8      11 Feb 2020 07:19          CAPILLARY BLOOD GLUCOSE      POCT Blood Glucose.: 187 mg/dL (11 Feb 2020 17:13)  POCT Blood Glucose.: 191 mg/dL (11 Feb 2020 11:45)  POCT Blood Glucose.: 217 mg/dL (11 Feb 2020 08:17)  POCT Blood Glucose.: 298 mg/dL (10 Feb 2020 21:29)          REVIEW OF SYSTEMS:  CONSTITUTIONAL: No fever, weight loss, or fatigue  EYES: No eye pain, visual disturbances, or discharge  ENMT:  No difficulty hearing, tinnitus, vertigo; No sinus or throat pain  NECK: No pain or stiffness  RESPIRATORY: No cough, wheezing, chills or hemoptysis; No shortness of breath  CARDIOVASCULAR: No chest pain, palpitations, dizziness, or leg swelling  GASTROINTESTINAL: No abdominal or epigastric pain. No nausea, vomiting, or hematemesis; No diarrhea or constipation. No melena or hematochezia.  GENITOURINARY: No dysuria, frequency, hematuria, or incontinence  NEUROLOGICAL: No headaches, memory loss, loss of strength, numbness, or tremors      Consultant(s) Notes Reviewed:  [x ] YES  [ ] NO    PHYSICAL EXAM:  GENERAL: NAD, well-groomed, well-developed, not in any distress ,  HEAD:  Atraumatic, Normocephalic  EYES: EOMI, PERRLA, conjunctiva and sclera clear  ENMT: No tonsillar erythema, exudates, or enlargement; Moist mucous membranes, Good dentition, No lesions  NECK: Supple, No JVD, Normal thyroid  NERVOUS SYSTEM:  Alert & Oriented X3, No focal deficit   CHEST/LUNG: Good air entry bilateral with no  rales, rhonchi, wheezing, or rubs  HEART: Regular rate and rhythm; No murmurs, rubs, or gallops  ABDOMEN: Soft, Nontender, Nondistended; Bowel sounds present  EXTREMITIES:  2+ Peripheral Pulses, No clubbing, cyanosis, or edema    Care Discussed with Consultants/Other Providers [ x] YES  [ ] NO

## 2020-02-11 NOTE — PROGRESS NOTE ADULT - PROBLEM SELECTOR PLAN 1
A1C 15.5%.   BS ranges bet. 300s and 500.   f/u Endo. dr. Ji  increased humalog 8 Units AC TID. Lantus 20 Units. Moderative corrective SS   monitor FS  maintain hydration
Glycemic control better.  Continue Lantus 20U HS and humalog 8U ac TID  Monitor glucose.  Patient to be sent home on insulin. Family needs to be taught on how to give insulin.

## 2020-02-11 NOTE — PROGRESS NOTE ADULT - PROBLEM SELECTOR PLAN 5
DVT ppx- Heparin SC
Family need to learn how to give patient insulin and diabetic teaching.  Patient cannot be discharged home until family masters insulin injections.  Will also have home care follow patient on discharge.

## 2020-02-11 NOTE — DISCHARGE NOTE NURSING/CASE MANAGEMENT/SOCIAL WORK - PATIENT PORTAL LINK FT
You can access the FollowMyHealth Patient Portal offered by [facility] by registering at the following website: [email or web address]. By joining [employer]’s FollowMyHealth portal, you will also be able to view your health information using other applications (apps) compatible with our system.

## 2020-02-11 NOTE — PROGRESS NOTE ADULT - ASSESSMENT
74M from home with PMH of DM, HTN BIBEMS for generalized itching of 5 days duration.    Renal insufficiency   Baseline SCr unknown  Likely pre-renal YANNICK vs. YANNICK on underlying CKD in the setting of longstanding DM  Scr improving on IVF  Advise to continue IVF at present   Optimize DM control   UA with protein, no blood   Renal sonogram without hydronephrosis however reveals CKD   Avoid NSAIDs    Hyponatremia  in the setting of elevated glucose  Now improved.   Optimize DM control    Proteinuria  Check spot urine TP/CR  Check urine culture  If TP/Cr elevated, will require further work up
74M from home with PMH of DM, HTN BIBEMS for generalized itching of 5 days duration.    Renal insufficiency   Baseline SCr unknown  Likely pre-renal YANNICK vs. YANNICK on underlying CKD in the setting of longstanding DM  Scr relatively stable   Advise to continue IVF at present   On losartan 25mg Q D- monitor closely  Optimize DM control   UA with protein, no blood   Renal sonogram without hydronephrosis however reveals CKD   Avoid NSAIDs    Hyponatremia  in the setting of elevated glucose  Now improved.   Optimize DM control    Proteinuria  Check spot urine TP/CR  Check urine culture  If TP/Cr elevated, will require further work up
74M from home with PMH of DM, HTN BIBEMS for generalized itching of 5 days duration.    Renal insufficiency   Baseline SCr unknown  Likely pre-renal YANNICK vs. YANNICK on underlying CKD in the setting of longstanding DM  Scr relatively stable   On losartan 25mg Q D- monitor closely  Optimize DM control   UA with protein, no blood   Renal sonogram without hydronephrosis however reveals CKD   Avoid NSAIDs    Hyponatremia  in the setting of elevated glucose  Now improved.   Optimize DM control    Proteinuria  Tp/Cr 2.0 gm proteinuria  Check Vasculitis up work up-- can be done as outpt if pt planned for discharged  Monitor proteinuria
74M from home with PMH of DM, HTN BIBEMS for generalized itching of 5 days duration. He has never experienced similar symptoms. It has been constant over the past week to the point that he decided to come in. He denies any variation in itching during day and night and mentions that it started all over the body all together. Denies taking any new medication, use any new soap, or bedsheets. Denies any infectious symptoms. No one with similar symptoms in the family. No pain. No fever, chills, n/v/d, abdominal pain.     Problem/Plan - 1:  ·  Problem: Uncontrolled diabetes mellitus .  Plan: Endo help appreciated.      Problem/Plan - 2:  ·  Problem: YANNICK (acute kidney injury).  PlanTrending BMP .  Renal helping.      Problem/Plan - 3:  ·  Problem: Rash.  Plan: - Improving .     Problem/Plan - 4:  ·  Problem: Hypertension.  Plan: Adjusting BP meds.   s/p IV labetelol 10 mg Once   - c/w : amlodipine, Losartan  Monitor BP   -Primary team to confirm home meds.      Problem/Plan - 5:  ·  Problem: Prophylactic measure.  Plan: IMPROVE VTE Individual Risk Assessment  RISK                                                                Points  [  ] Previous VTE                                                  3  [  ] Thrombophilia                                               2  [  ] Lower limb paralysis                                      2        (unable to hold up >15 seconds)    [  ] Current Cancer                                              2         (within 6 months)  [x  ] Immobilization > 24 hrs                                1  [  ] ICU/CCU stay > 24 hours                              1  [x  ] Age > 60                                                      1  IMPROVE VTE Score _________2, heparin  for DVT proph.
74M from home with PMH of DM, HTN BIBEMS for generalized itching of 5 days duration. He has never experienced similar symptoms. It has been constant over the past week to the point that he decided to come in. He denies any variation in itching during day and night and mentions that it started all over the body all together. Denies taking any new medication, use any new soap, or bedsheets. Denies any infectious symptoms. No one with similar symptoms in the family. No pain. No fever, chills, n/v/d, abdominal pain.     Problem/Plan - 1:  ·  Problem: Uncontrolled diabetes mellitus with hyperglycemia .  Plan: Endo help appreciated. Sugars better .      Problem/Plan - 2:  ·  Problem: YANNICK (acute kidney injury).  Plan : Trending BMP .  Renal helping.      Problem/Plan - 3:  ·  Problem: Rash.  Plan: - Improving . Has long history as per son . has seen dermatology outside.      Problem/Plan - 4:  ·  Problem: Hypertension.  Plan: BP readings better.   s/p IV labetelol 10 mg Once   - c/w : amlodipine, Losartan  Monitor BP      Problem/Plan - 5:  ·  Problem: Prophylactic measure.  Plan:                                                    1  IMPROVE VTE Score _________2, heparin  for DVT proph.     Disposition : Medically stable for DC .
74M from home with PMH of DM, HTN BIBEMS for generalized itching of 5 days duration. He has never experienced similar symptoms. It has been constant over the past week to the point that he decided to come in. He denies any variation in itching during day and night and mentions that it started all over the body all together. Denies taking any new medication, use any new soap, or bedsheets. Denies any infectious symptoms. No one with similar symptoms in the family. No pain. No fever, chills, n/v/d, abdominal pain.     Problem/Plan - 1:  ·  Problem: Uncontrolled diabetes mellitus with hyperglycemia .  Plan: Endo help appreciated. Sugars better now.      Problem/Plan - 2:  ·  Problem: CKD stage 3.  Plan : Likely chronic .   Renal helping.      Problem/Plan - 3:  ·  Problem: Rash.  Plan: - Improving . Has long history as per son . Has seen dermatology outside.      Problem/Plan - 4:  ·  Problem: Hypertension.  Plan: BP readings better.   s/p IV labetalol 10 mg Once   - c/w : amlodipine, Losartan  Monitor BP      Problem/Plan - 5:  ·  Problem: Prophylactic measure.  Plan:                                                    1  IMPROVE VTE Score _________2, heparin  for DVT proph.     Disposition : Medically stable for DC home to follow up outpt with PCP and Endo.
74M from home with PMH of DM, HTN BIBEMS for generalized itching of 5 days duration. He has never experienced similar symptoms. It has been constant over the past week to the point that he decided to come in. He denies any variation in itching during day and night and mentions that it started all over the body all together. Denies taking any new medication, use any new soap, or bedsheets. Denies any infectious symptoms. No one with similar symptoms in the family. No pain. No fever, chills, n/v/d, abdominal pain.     Problem/Plan - 1:  ·  Problem: Uncontrolled diabetes mellitus with hyperglycemia .  Plan: Endo help appreciated. Sugars still high but off steroids now.       Problem/Plan - 2:  ·  Problem: CKD stage 3.  Plan : Likely chronic .   Renal helping.      Problem/Plan - 3:  ·  Problem: Rash.  Plan: - Improving . Has long history as per son . Has seen dermatology outside.      Problem/Plan - 4:  ·  Problem: Hypertension.  Plan: BP readings better.   s/p IV labetelol 10 mg Once   - c/w : amlodipine, Losartan  Monitor BP      Problem/Plan - 5:  ·  Problem: Prophylactic measure.  Plan:                                                    1  IMPROVE VTE Score _________2, heparin  for DVT proph.     Disposition : Medically stable for DC home .
Pt was found to have YANNICK with uptending Cr. Evaluated by nephrology Dr. Huggins. continuing IVF.   s/p 3 days prednisone. started topical steroid and Loratadine, PRN benadryl for pruritis.   Endocrinology following for uncontrolled DM. Increased  insulin regimen. Followed by nutritionist for education. PT evaluated for functioning.   Cannot be discharged home until family members learn how to give patient his insulin.

## 2020-02-11 NOTE — CHART NOTE - NSCHARTNOTEFT_GEN_A_CORE
Pt. noted anxious and eager to go home.  Pt.'s son Tung Galvan contacted at [phone].  Explained to son that pt. is medically stable for discharge to home and discharge is pending family education re Insulin administration.  Pt.'s son informed me that he and his wife know how to administer Insulin and will make sure to follow prescribed regimen.  Pt.'s son agree that discharge to home is best for pt. at this time as he has no other medical concerns or complaints medical issues.  He will come around 8pm to pick pt. up.

## 2020-02-11 NOTE — PROGRESS NOTE ADULT - SUBJECTIVE AND OBJECTIVE BOX
[facility]   MD RUKHSANA CERVANTES MD RUORU WONG, PA    TEL:  OFFICE: [phone]  DR HUDDLESTON CELL: [phone]  MISTI DERAS CELL: [phone]  DR. RUVALCABA CELL: [phone]  DR. RAE CELL: [phone]    FROM 5 PM - 7 AM PLEASE CALL ANSWERING SERVICE: [phone]    RENAL FOLLOW UP NOTE  --------------------------------------------------------------------------------  HPI:      Pt seen and examined at bedside.   Denies SOB, chest pain     PAST HISTORY  --------------------------------------------------------------------------------  No significant changes to PMH, PSH, FHx, SHx, unless otherwise noted    ALLERGIES & MEDICATIONS  --------------------------------------------------------------------------------  Allergies    No Known Allergies    Intolerances      Standing Inpatient Medications  amLODIPine   Tablet 5 milliGRAM(s) Oral daily  aspirin  chewable 81 milliGRAM(s) Oral daily  atorvastatin 40 milliGRAM(s) Oral at bedtime  betamethasone valerate 0.1% Cream 1 Application(s) Topical two times a day  chlorhexidine 2% Cloths 1 Application(s) Topical <User Schedule>  famotidine    Tablet 20 milliGRAM(s) Oral two times a day  heparin  Injectable 5000 Unit(s) SubCutaneous every 12 hours  insulin glargine Injectable (LANTUS) 20 Unit(s) SubCutaneous at bedtime  insulin lispro (HumaLOG) corrective regimen sliding scale   SubCutaneous three times a day before meals  insulin lispro Injectable (HumaLOG) 8 Unit(s) SubCutaneous three times a day before meals  loratadine 10 milliGRAM(s) Oral daily  losartan 25 milliGRAM(s) Oral daily  mupirocin 2% Nasal 1 Application(s) Nasal two times a day  sodium chloride 0.9%. 1000 milliLiter(s) IV Continuous <Continuous>    PRN Inpatient Medications  acetaminophen   Tablet .. 650 milliGRAM(s) Oral every 6 hours PRN  calamine Lotion 1 Application(s) Topical every 6 hours PRN  diphenhydrAMINE 25 milliGRAM(s) Oral every 6 hours PRN      REVIEW OF SYSTEMS  --------------------------------------------------------------------------------  General: no fever  CVS: no chest pain  RESP: no sob, no cough  ABD: no abdominal pain  : no dysuria,  MSK: no edema     VITALS/PHYSICAL EXAM  --------------------------------------------------------------------------------  T(C): 36.4 (02-11-20 @ 05:22), Max: 36.8 (02-10-20 @ 13:55)  HR: 64 (02-11-20 @ 05:22) (64 - 85)  BP: 163/76 (02-11-20 @ 05:22) (152/67 - 167/70)  RR: 18 (02-11-20 @ 05:22) (16 - 18)  SpO2: 99% (02-11-20 @ 05:22) (97% - 100%)  Wt(kg): --        Physical Exam:  	Gen: NAD  	HEENT: MMM  	Pulm: CTA B/L  	CV: S1S2  	Abd: Soft, +BS  	Ext: No LE edema B/L                      Neuro: Awake alert  	Skin: Warm and Dry   	 no echo    LABS/STUDIES  --------------------------------------------------------------------------------              13.8   9.71  >-----------<  276      [02-11-20 @ 07:19]              42.1     135  |  101  |  18  ----------------------------<  235      [02-11-20 @ 07:19]  4.2   |  30  |  1.32        Ca     8.8     [02-11-20 @ 07:19]            Creatinine Trend:  SCr 1.32 [02-11 @ 07:19]  SCr 1.46 [02-10 @ 06:38]  SCr 1.37 [02-09 @ 07:03]  SCr 1.44 [02-08 @ 06:23]  SCr 1.58 [02-07 @ 21:04]    Urinalysis - [02-07-20 @ 23:59]      Color Yellow / Appearance Clear / SG 1.010 / pH 5.0      Gluc 1000 / Ketone Negative  / Bili Negative / Urobili Negative       Blood Negative / Protein 100 / Leuk Est Negative / Nitrite Negative      RBC 0-2 / WBC 0-2 / Hyaline 0-2 / Gran  / Sq Epi  / Non Sq Epi Few / Bacteria Moderate    Urine Creatinine 42      [02-10-20 @ 16:30]  Urine Protein 86      [02-10-20 @ 16:30]  Urine Sodium 80      [02-08-20 @ 09:21]  Urine Urea Nitrogen 210      [02-08-20 @ 17:37]  Urine Osmolality 355      [02-08-20 @ 09:21]    Vitamin D (25OH) 33.7      [02-08-20 @ 11:46]  HbA1c >15.5      [02-09-20 @ 10:50]  TSH 2.26      [02-08-20 @ 06:23]  Lipid: chol 139, TG 81, HDL 56, LDL 67      [02-08-20 @ 06:23]

## 2020-02-11 NOTE — PROGRESS NOTE ADULT - PROBLEM SELECTOR PLAN 3
Uptending Cr 1.46.   c/w IVF  F/u Nephrology dr. Huggins.   f/u urine TP/Cr spot test.
Continue current medications.

## 2020-02-11 NOTE — PROGRESS NOTE ADULT - PROBLEM SELECTOR PLAN 2
no pustules or erythema.  Extremely dry and scaly. scratch marks over the body  c/w Calamine cream. steroid cream.  Claritin and PRN benadryl.   pt needs derm f/u at outpatient
Continue calamine and steroid cream.  Will need derm follow up as an outpatient.

## 2020-02-12 PROBLEM — I10 ESSENTIAL (PRIMARY) HYPERTENSION: Chronic | Status: ACTIVE | Noted: 2018-04-18

## 2020-02-12 PROBLEM — E11.9 TYPE 2 DIABETES MELLITUS WITHOUT COMPLICATIONS: Chronic | Status: ACTIVE | Noted: 2018-04-18

## 2020-02-12 RX ORDER — METFORMIN HYDROCHLORIDE 850 MG/1
1 TABLET ORAL
Qty: 0 | Refills: 0 | DISCHARGE

## 2020-02-12 RX ORDER — REPAGLINIDE 1 MG/1
0 TABLET ORAL
Qty: 0 | Refills: 0 | DISCHARGE

## 2020-02-12 RX ORDER — RANITIDINE HYDROCHLORIDE 150 MG/1
1 TABLET, FILM COATED ORAL
Qty: 0 | Refills: 0 | DISCHARGE

## 2020-02-12 RX ORDER — VALSARTAN 80 MG/1
1 TABLET ORAL
Qty: 0 | Refills: 0 | DISCHARGE

## 2020-02-12 RX ORDER — DEXLANSOPRAZOLE 30 MG/1
1 CAPSULE, DELAYED RELEASE ORAL
Qty: 0 | Refills: 0 | DISCHARGE
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
## 2021-08-28 ENCOUNTER — INPATIENT (INPATIENT)
Facility: HOSPITAL | Age: 76
LOS: 3 days | Discharge: HOME | End: 2021-09-01
Attending: INTERNAL MEDICINE | Admitting: INTERNAL MEDICINE
Payer: MEDICARE

## 2021-08-28 VITALS
HEART RATE: 97 BPM | TEMPERATURE: 97 F | HEIGHT: 66 IN | SYSTOLIC BLOOD PRESSURE: 103 MMHG | RESPIRATION RATE: 19 BRPM | DIASTOLIC BLOOD PRESSURE: 65 MMHG | WEIGHT: 160.06 LBS | OXYGEN SATURATION: 97 %

## 2021-08-28 PROBLEM — I10 ESSENTIAL (PRIMARY) HYPERTENSION: Chronic | Status: ACTIVE | Noted: 2020-02-08

## 2021-08-28 PROBLEM — E11.9 TYPE 2 DIABETES MELLITUS WITHOUT COMPLICATIONS: Chronic | Status: ACTIVE | Noted: 2020-02-08

## 2021-08-28 LAB
ALBUMIN SERPL ELPH-MCNC: 2.3 G/DL — LOW (ref 3.3–5)
ALP SERPL-CCNC: 88 U/L — SIGNIFICANT CHANGE UP (ref 40–120)
ALT FLD-CCNC: 8 U/L — LOW (ref 12–78)
ANION GAP SERPL CALC-SCNC: 6 MMOL/L — SIGNIFICANT CHANGE UP (ref 5–17)
APPEARANCE UR: CLEAR — SIGNIFICANT CHANGE UP
APTT BLD: 28.4 SEC — SIGNIFICANT CHANGE UP (ref 27.5–35.5)
AST SERPL-CCNC: 8 U/L — LOW (ref 15–37)
BACTERIA # UR AUTO: ABNORMAL
BASOPHILS # BLD AUTO: 0.05 K/UL — SIGNIFICANT CHANGE UP (ref 0–0.2)
BASOPHILS NFR BLD AUTO: 0.6 % — SIGNIFICANT CHANGE UP (ref 0–2)
BILIRUB SERPL-MCNC: 0.6 MG/DL — SIGNIFICANT CHANGE UP (ref 0.2–1.2)
BILIRUB UR-MCNC: NEGATIVE — SIGNIFICANT CHANGE UP
BUN SERPL-MCNC: 7 MG/DL — SIGNIFICANT CHANGE UP (ref 7–23)
CALCIUM SERPL-MCNC: 8 MG/DL — LOW (ref 8.5–10.1)
CHLORIDE SERPL-SCNC: 100 MMOL/L — SIGNIFICANT CHANGE UP (ref 96–108)
CO2 SERPL-SCNC: 29 MMOL/L — SIGNIFICANT CHANGE UP (ref 22–31)
COLOR SPEC: YELLOW — SIGNIFICANT CHANGE UP
CREAT SERPL-MCNC: 1.27 MG/DL — SIGNIFICANT CHANGE UP (ref 0.5–1.3)
DIFF PNL FLD: NEGATIVE — SIGNIFICANT CHANGE UP
EOSINOPHIL # BLD AUTO: 0.61 K/UL — HIGH (ref 0–0.5)
EOSINOPHIL NFR BLD AUTO: 7 % — HIGH (ref 0–6)
EPI CELLS # UR: SIGNIFICANT CHANGE UP
FLUAV AG NPH QL: SIGNIFICANT CHANGE UP
FLUBV AG NPH QL: SIGNIFICANT CHANGE UP
GLUCOSE BLDC GLUCOMTR-MCNC: 239 MG/DL — HIGH (ref 70–99)
GLUCOSE BLDC GLUCOMTR-MCNC: 373 MG/DL — HIGH (ref 70–99)
GLUCOSE BLDC GLUCOMTR-MCNC: 99 MG/DL — SIGNIFICANT CHANGE UP (ref 70–99)
GLUCOSE SERPL-MCNC: 117 MG/DL — HIGH (ref 70–99)
GLUCOSE UR QL: 1000 MG/DL
HCT VFR BLD CALC: 35.9 % — LOW (ref 39–50)
HGB BLD-MCNC: 12.3 G/DL — LOW (ref 13–17)
HYALINE CASTS # UR AUTO: ABNORMAL /LPF
IMM GRANULOCYTES NFR BLD AUTO: 0.3 % — SIGNIFICANT CHANGE UP (ref 0–1.5)
INR BLD: 0.99 RATIO — SIGNIFICANT CHANGE UP (ref 0.88–1.16)
KETONES UR-MCNC: NEGATIVE — SIGNIFICANT CHANGE UP
LEUKOCYTE ESTERASE UR-ACNC: NEGATIVE — SIGNIFICANT CHANGE UP
LIDOCAIN IGE QN: 300 U/L — SIGNIFICANT CHANGE UP (ref 73–393)
LYMPHOCYTES # BLD AUTO: 2.52 K/UL — SIGNIFICANT CHANGE UP (ref 1–3.3)
LYMPHOCYTES # BLD AUTO: 28.9 % — SIGNIFICANT CHANGE UP (ref 13–44)
MCHC RBC-ENTMCNC: 29.1 PG — SIGNIFICANT CHANGE UP (ref 27–34)
MCHC RBC-ENTMCNC: 34.3 GM/DL — SIGNIFICANT CHANGE UP (ref 32–36)
MCV RBC AUTO: 84.9 FL — SIGNIFICANT CHANGE UP (ref 80–100)
MONOCYTES # BLD AUTO: 0.87 K/UL — SIGNIFICANT CHANGE UP (ref 0–0.9)
MONOCYTES NFR BLD AUTO: 10 % — SIGNIFICANT CHANGE UP (ref 2–14)
NEUTROPHILS # BLD AUTO: 4.64 K/UL — SIGNIFICANT CHANGE UP (ref 1.8–7.4)
NEUTROPHILS NFR BLD AUTO: 53.2 % — SIGNIFICANT CHANGE UP (ref 43–77)
NITRITE UR-MCNC: NEGATIVE — SIGNIFICANT CHANGE UP
NRBC # BLD: 0 /100 WBCS — SIGNIFICANT CHANGE UP (ref 0–0)
NT-PROBNP SERPL-SCNC: 483 PG/ML — HIGH (ref 0–450)
PH UR: 5 — SIGNIFICANT CHANGE UP (ref 5–8)
PLATELET # BLD AUTO: 260 K/UL — SIGNIFICANT CHANGE UP (ref 150–400)
POTASSIUM SERPL-MCNC: 3.4 MMOL/L — LOW (ref 3.5–5.3)
POTASSIUM SERPL-SCNC: 3.4 MMOL/L — LOW (ref 3.5–5.3)
PROT SERPL-MCNC: 7 GM/DL — SIGNIFICANT CHANGE UP (ref 6–8.3)
PROT UR-MCNC: 100 MG/DL
PROTHROM AB SERPL-ACNC: 11.5 SEC — SIGNIFICANT CHANGE UP (ref 10.6–13.6)
RBC # BLD: 4.23 M/UL — SIGNIFICANT CHANGE UP (ref 4.2–5.8)
RBC # FLD: 14.6 % — HIGH (ref 10.3–14.5)
SARS-COV-2 RNA SPEC QL NAA+PROBE: SIGNIFICANT CHANGE UP
SODIUM SERPL-SCNC: 135 MMOL/L — SIGNIFICANT CHANGE UP (ref 135–145)
SP GR SPEC: 1.01 — SIGNIFICANT CHANGE UP (ref 1.01–1.02)
TROPONIN I SERPL-MCNC: <.015 NG/ML — SIGNIFICANT CHANGE UP (ref 0.01–0.04)
UROBILINOGEN FLD QL: NEGATIVE MG/DL — SIGNIFICANT CHANGE UP
WBC # BLD: 8.72 K/UL — SIGNIFICANT CHANGE UP (ref 3.8–10.5)
WBC # FLD AUTO: 8.72 K/UL — SIGNIFICANT CHANGE UP (ref 3.8–10.5)

## 2021-08-28 PROCEDURE — 93010 ELECTROCARDIOGRAM REPORT: CPT

## 2021-08-28 PROCEDURE — 71045 X-RAY EXAM CHEST 1 VIEW: CPT | Mod: 26

## 2021-08-28 PROCEDURE — 99285 EMERGENCY DEPT VISIT HI MDM: CPT

## 2021-08-28 PROCEDURE — 70486 CT MAXILLOFACIAL W/O DYE: CPT | Mod: 26,MA

## 2021-08-28 PROCEDURE — 70450 CT HEAD/BRAIN W/O DYE: CPT | Mod: 26

## 2021-08-28 PROCEDURE — 99223 1ST HOSP IP/OBS HIGH 75: CPT

## 2021-08-28 PROCEDURE — 72125 CT NECK SPINE W/O DYE: CPT | Mod: 26

## 2021-08-28 RX ORDER — INSULIN LISPRO 100/ML
6 VIAL (ML) SUBCUTANEOUS
Refills: 0 | Status: DISCONTINUED | OUTPATIENT
Start: 2021-08-28 | End: 2021-09-01

## 2021-08-28 RX ORDER — LANOLIN ALCOHOL/MO/W.PET/CERES
3 CREAM (GRAM) TOPICAL ONCE
Refills: 0 | Status: COMPLETED | OUTPATIENT
Start: 2021-08-28 | End: 2021-08-28

## 2021-08-28 RX ORDER — OLANZAPINE 15 MG/1
5 TABLET, FILM COATED ORAL ONCE
Refills: 0 | Status: COMPLETED | OUTPATIENT
Start: 2021-08-28 | End: 2021-08-28

## 2021-08-28 RX ORDER — AMLODIPINE BESYLATE 2.5 MG/1
5 TABLET ORAL DAILY
Refills: 0 | Status: DISCONTINUED | OUTPATIENT
Start: 2021-08-28 | End: 2021-09-01

## 2021-08-28 RX ORDER — DEXTROSE 50 % IN WATER 50 %
25 SYRINGE (ML) INTRAVENOUS ONCE
Refills: 0 | Status: DISCONTINUED | OUTPATIENT
Start: 2021-08-28 | End: 2021-09-01

## 2021-08-28 RX ORDER — INSULIN LISPRO 100/ML
VIAL (ML) SUBCUTANEOUS
Refills: 0 | Status: DISCONTINUED | OUTPATIENT
Start: 2021-08-28 | End: 2021-09-01

## 2021-08-28 RX ORDER — GLUCAGON INJECTION, SOLUTION 0.5 MG/.1ML
1 INJECTION, SOLUTION SUBCUTANEOUS ONCE
Refills: 0 | Status: DISCONTINUED | OUTPATIENT
Start: 2021-08-28 | End: 2021-09-01

## 2021-08-28 RX ORDER — DEXTROSE 50 % IN WATER 50 %
15 SYRINGE (ML) INTRAVENOUS ONCE
Refills: 0 | Status: DISCONTINUED | OUTPATIENT
Start: 2021-08-28 | End: 2021-09-01

## 2021-08-28 RX ORDER — INSULIN GLARGINE 100 [IU]/ML
20 INJECTION, SOLUTION SUBCUTANEOUS AT BEDTIME
Refills: 0 | Status: DISCONTINUED | OUTPATIENT
Start: 2021-08-28 | End: 2021-09-01

## 2021-08-28 RX ORDER — HEPARIN SODIUM 5000 [USP'U]/ML
5000 INJECTION INTRAVENOUS; SUBCUTANEOUS EVERY 12 HOURS
Refills: 0 | Status: DISCONTINUED | OUTPATIENT
Start: 2021-08-28 | End: 2021-09-01

## 2021-08-28 RX ORDER — SODIUM CHLORIDE 9 MG/ML
1000 INJECTION, SOLUTION INTRAVENOUS
Refills: 0 | Status: DISCONTINUED | OUTPATIENT
Start: 2021-08-28 | End: 2021-09-01

## 2021-08-28 RX ORDER — DEXTROSE 50 % IN WATER 50 %
12.5 SYRINGE (ML) INTRAVENOUS ONCE
Refills: 0 | Status: DISCONTINUED | OUTPATIENT
Start: 2021-08-28 | End: 2021-09-01

## 2021-08-28 RX ORDER — PANTOPRAZOLE SODIUM 20 MG/1
40 TABLET, DELAYED RELEASE ORAL
Refills: 0 | Status: DISCONTINUED | OUTPATIENT
Start: 2021-08-28 | End: 2021-09-01

## 2021-08-28 RX ORDER — LATANOPROST 0.05 MG/ML
1 SOLUTION/ DROPS OPHTHALMIC; TOPICAL AT BEDTIME
Refills: 0 | Status: DISCONTINUED | OUTPATIENT
Start: 2021-08-28 | End: 2021-09-01

## 2021-08-28 RX ORDER — ATORVASTATIN CALCIUM 80 MG/1
40 TABLET, FILM COATED ORAL AT BEDTIME
Refills: 0 | Status: DISCONTINUED | OUTPATIENT
Start: 2021-08-28 | End: 2021-09-01

## 2021-08-28 RX ORDER — ASPIRIN/CALCIUM CARB/MAGNESIUM 324 MG
81 TABLET ORAL DAILY
Refills: 0 | Status: DISCONTINUED | OUTPATIENT
Start: 2021-08-28 | End: 2021-09-01

## 2021-08-28 RX ADMIN — Medication 6 UNIT(S): at 17:22

## 2021-08-28 RX ADMIN — Medication 81 MILLIGRAM(S): at 12:13

## 2021-08-28 RX ADMIN — HEPARIN SODIUM 5000 UNIT(S): 5000 INJECTION INTRAVENOUS; SUBCUTANEOUS at 17:23

## 2021-08-28 RX ADMIN — PANTOPRAZOLE SODIUM 40 MILLIGRAM(S): 20 TABLET, DELAYED RELEASE ORAL at 17:23

## 2021-08-28 RX ADMIN — INSULIN GLARGINE 20 UNIT(S): 100 INJECTION, SOLUTION SUBCUTANEOUS at 21:36

## 2021-08-28 RX ADMIN — ATORVASTATIN CALCIUM 40 MILLIGRAM(S): 80 TABLET, FILM COATED ORAL at 21:23

## 2021-08-28 RX ADMIN — Medication 3 MILLIGRAM(S): at 21:21

## 2021-08-28 RX ADMIN — LATANOPROST 1 DROP(S): 0.05 SOLUTION/ DROPS OPHTHALMIC; TOPICAL at 21:36

## 2021-08-28 RX ADMIN — Medication 10: at 12:14

## 2021-08-28 RX ADMIN — AMLODIPINE BESYLATE 5 MILLIGRAM(S): 2.5 TABLET ORAL at 12:13

## 2021-08-28 RX ADMIN — Medication 6 UNIT(S): at 12:13

## 2021-08-28 RX ADMIN — OLANZAPINE 5 MILLIGRAM(S): 15 TABLET, FILM COATED ORAL at 12:13

## 2021-08-28 NOTE — ED ADULT NURSE NOTE - NSIMPLEMENTINTERV_GEN_ALL_ED
Implemented All Fall Risk Interventions:  [facility] to call system. Call bell, personal items and telephone within reach. Instruct patient to call for assistance. Room bathroom lighting operational. Non-slip footwear when patient is off stretcher. Physically safe environment: no spills, clutter or unnecessary equipment. Stretcher in lowest position, wheels locked, appropriate side rails in place. Provide visual cue, wrist band, yellow gown, etc. Monitor gait and stability. Monitor for mental status changes and reorient to person, place, and time. Review medications for side effects contributing to fall risk. Reinforce activity limits and safety measures with patient and family.

## 2021-08-28 NOTE — ED ADULT NURSE NOTE - ED STAT RN HANDOFF DETAILS 2
Report received from RN at this time. Assessment available on [facility]. will continue to monitor, pt is confused at this time, language line Ligia used to help facilitate but pt is confused and states "I don't know",

## 2021-08-28 NOTE — ED ADULT NURSE REASSESSMENT NOTE - NS ED NURSE REASSESS COMMENT FT1
as per night RN pt already went to the bathroom, urine was not collected at that time, will reassess

## 2021-08-28 NOTE — ED PROVIDER NOTE - OBJECTIVE STATEMENT
Pt is a 76 yo gentleman with a pmhx of HTN, DM who presents to the ED with EMS after a reported fall, where he hit his nose. Pt is not able to answer questions, even with [facility]  which is his reported primary language, says 'I don't know' to questions. Pt son attempted to be called, but no responsive. Pt denies any complaints. Evidence of abrasion to nose, no epistaxis.

## 2021-08-28 NOTE — ED PROVIDER NOTE - PROGRESS NOTE DETAILS
Dr. Nicole RAIN is fluent in [facility], and with her, pt is alert and oriented and says he has had a mechanical fall. Says his son called EMS, and he wants to go home. Son has not answered calls, and pt does not know his number currently. Pt has capacity to decline admission at this time, is alert and oriented. Michael: pt signed out to me at 7 am. He has been yelling in the ER x 1 hr, does not understand why he is in the ER. Reassessed with [facility]  (Brandie,  # [id number]), pt alert and oriented to self only, cannot recall date, where he is, or why he is here. Suspect delirium. Pt does not have capacity to make medical decisions. Unable to reach son, no family has reached out to ED. TBA.

## 2021-08-28 NOTE — H&P ADULT - NSHPLABSRESULTS_GEN_ALL_CORE
LABS:                        12.3   8.72  )-----------( 260      ( 28 Aug 2021 04:09 )             35.9     08-28    135  |  100  |  7   ----------------------------<  117<H>  3.4<L>   |  29  |  1.27    Ca    8.0<L>      28 Aug 2021 04:09    TPro  7.0  /  Alb  2.3<L>  /  TBili  0.6  /  DBili  x   /  AST  8<L>  /  ALT  8<L>  /  AlkPhos  88  08-28    PT/INR - ( 28 Aug 2021 04:09 )   PT: 11.5 sec;   INR: 0.99 ratio         PTT - ( 28 Aug 2021 04:09 )  PTT:28.4 sec        RADIOLOGY & ADDITIONAL TESTS:

## 2021-08-28 NOTE — H&P ADULT - HISTORY OF PRESENT ILLNESS
74 yo gentleman PMH HTN, DM who presents to the ED with EMS after a reported fall, where he hit his nose. Pt is not able to answer questions, even with [facility]  which is his reported primary language. Says  'I don't know' to all questions. Pt son attempted to be called, but no responsive. Pt denies any complaints. Evidence of abrasion to nose, no epistaxis.

## 2021-08-28 NOTE — ED ADULT NURSE NOTE - OBJECTIVE STATEMENT
Patient biba s/p fall, as per ems, patient with swelling of nose x 45 mins. Ligia  used. Patient is poor historian. as per  Patient states he does not know why he is here and does not know if he is pain, and he does not know his name.

## 2021-08-28 NOTE — H&P ADULT - ASSESSMENT
74 yo gentleman PMH HTN, DM who presents to the ED with EMS after a reported fall, where he hit his nose. Pt is not able to answer questions, even with [facility]  which is his reported primary language. Says  'I don't know' to all questions. Pt son attempted to be called, but no responsive. Pt denies any complaints. Evidence of abrasion to nose, no epistaxis.    Plan: Falll at home, CT head no acute injuries, no nasal fracture. PT eval requested. 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 Will continue all home meds Norvasc 5 mg/day, ASA 81 mg/day, Lipitor 40 mg/day,   Protonix 40 mg/day, Lantus 20 and Admelog 6 units tid. Labs WNL. Glucose 117 on arrival.     Awailt PT eval to determine discharge.  74 yo gentleman PMH HTN, DM who presents to the ED with EMS after a reported fall, where he hit his nose. Pt is not able to answer questions, even with [facility]  which is his reported primary language. Says  'I don't know' to all questions. Pt son attempted to be called, but no responsive. Pt denies any complaints. Evidence of abrasion to nose, no epistaxis.    Plan: Falll at home, CT head no acute injuries, no nasal fracture. PT eval requested. 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 Will continue all home meds Norvasc 5 mg/day, ASA 81 mg/day, Lipitor 40 mg/day,   Protonix 40 mg/day, Lantus 20 and Admelog 6 units tid. Labs WNL. Glucose 117 on arrival. EKG is NSR, CXR is clear.     Awailt PT eval to determine discharge.

## 2021-08-28 NOTE — ED PROVIDER NOTE - ENMT, MLM
Airway patent, Nasal mucosa clear. Mouth with normal mucosa. Throat has no vesicles, no oropharyngeal exudates and uvula is midline. Abrasion to nose, no epistaxis

## 2021-08-28 NOTE — ED ADULT TRIAGE NOTE - CHIEF COMPLAINT QUOTE
pt biba s/p fall, pw nose pain/swelling x 45 minutes. as per ems, son denies LOC, and blood thinners

## 2021-08-28 NOTE — ED PROVIDER NOTE - CLINICAL SUMMARY MEDICAL DECISION MAKING FREE TEXT BOX
Ddx: Dementia/ ro ICH/ ro fracture  Plan: Ct head, cspine, Max/face, ro syncope, call family, reassess

## 2021-08-28 NOTE — H&P ADULT - NSHPPHYSICALEXAM_GEN_ALL_CORE
PHYSICAL EXAMINATION:  Vital Signs Last 24 Hrs  T(C): 36.9 (28 Aug 2021 07:19), Max: 36.9 (28 Aug 2021 07:19)  T(F): 98.5 (28 Aug 2021 07:19), Max: 98.5 (28 Aug 2021 07:19)  HR: 79 (28 Aug 2021 07:19) (79 - 97)  BP: 126/66 (28 Aug 2021 07:19) (103/65 - 130/64)  BP(mean): --  RR: 15 (28 Aug 2021 07:19) (15 - 19)  SpO2: 100% (28 Aug 2021 07:19) (97% - 100%)  CAPILLARY BLOOD GLUCOSE          GENERAL: NAD, well-groomed, well-developed  HEAD:  atraumatic, normocephalic  EYES: sclera anicteric  ENMT: mucous membranes moist  NECK: supple, No JVD  CHEST/LUNG: clear to auscultation bilaterally; no rales, rhonchi, or wheezing b/l  HEART: normal S1, S2  ABDOMEN: BS+, soft, ND, NT   EXTREMITIES:  pulses palpable; no clubbing, cyanosis, or edema b/l LEs  NEURO: awake, alert, interactive; moves all extremities  SKIN: no rashes or lesions PHYSICAL EXAMINATION:  Vital Signs Last 24 Hrs  T(C): 36.9 (28 Aug 2021 07:19), Max: 36.9 (28 Aug 2021 07:19)  T(F): 98.5 (28 Aug 2021 07:19), Max: 98.5 (28 Aug 2021 07:19)  HR: 79 (28 Aug 2021 07:19) (79 - 97)  BP: 126/66 (28 Aug 2021 07:19) (103/65 - 130/64)  BP(mean): --  RR: 15 (28 Aug 2021 07:19) (15 - 19)  SpO2: 100% (28 Aug 2021 07:19) (97% - 100%)  CAPILLARY BLOOD GLUCOSE          GENERAL: NAD, stable in ER, no CP or SOB  HEAD:  atraumatic, normocephalic  EYES: sclera anicteric  ENMT: mucous membranes moist  NECK: supple, No JVD  CHEST/LUNG: clear to auscultation bilaterally; no rales, rhonchi, or wheezing b/l  HEART: normal S1, S2  ABDOMEN: BS+, soft, ND, NT   EXTREMITIES:  pulses palpable; no clubbing, cyanosis, or edema b/l LEs  NEURO: awake, alert, interactive; moves all extremities  SKIN: no rashes or lesions

## 2021-08-28 NOTE — ED ADULT NURSE NOTE - ED STAT RN HANDOFF DETAILS 3
Report endorsed to oncoming Mamta RN. Safety checks compld this shift/Safety rounds completed hourly.  IV sites checked Q2+remains WDL. Meds given as ord with no s/s of adverse RXNs. Fall +skin precs in place. Any issues endorsed to onclenny RN for follow up.  endorsed to Mamta RN followup on collecting urine as its pending

## 2021-08-29 DIAGNOSIS — E11.65 TYPE 2 DIABETES MELLITUS WITH HYPERGLYCEMIA: ICD-10-CM
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
## 2021-08-29 LAB
A1C WITH ESTIMATED AVERAGE GLUCOSE RESULT: 13.5 % — HIGH (ref 4–5.6)
CULTURE RESULTS: SIGNIFICANT CHANGE UP
ESTIMATED AVERAGE GLUCOSE: 341 MG/DL — HIGH (ref 68–114)
GLUCOSE BLDC GLUCOMTR-MCNC: 273 MG/DL — HIGH (ref 70–99)
GLUCOSE BLDC GLUCOMTR-MCNC: 61 MG/DL — LOW (ref 70–99)
SPECIMEN SOURCE: SIGNIFICANT CHANGE UP

## 2021-08-29 PROCEDURE — 99232 SBSQ HOSP IP/OBS MODERATE 35: CPT

## 2021-08-29 RX ORDER — HALOPERIDOL DECANOATE 100 MG/ML
2 INJECTION INTRAMUSCULAR ONCE
Refills: 0 | Status: COMPLETED | OUTPATIENT
Start: 2021-08-29 | End: 2021-08-29

## 2021-08-29 RX ORDER — LANOLIN ALCOHOL/MO/W.PET/CERES
3 CREAM (GRAM) TOPICAL AT BEDTIME
Refills: 0 | Status: DISCONTINUED | OUTPATIENT
Start: 2021-08-29 | End: 2021-09-01

## 2021-08-29 RX ADMIN — Medication 6: at 08:36

## 2021-08-29 RX ADMIN — PANTOPRAZOLE SODIUM 40 MILLIGRAM(S): 20 TABLET, DELAYED RELEASE ORAL at 06:19

## 2021-08-29 RX ADMIN — ATORVASTATIN CALCIUM 40 MILLIGRAM(S): 80 TABLET, FILM COATED ORAL at 21:34

## 2021-08-29 RX ADMIN — LATANOPROST 1 DROP(S): 0.05 SOLUTION/ DROPS OPHTHALMIC; TOPICAL at 21:34

## 2021-08-29 RX ADMIN — HEPARIN SODIUM 5000 UNIT(S): 5000 INJECTION INTRAVENOUS; SUBCUTANEOUS at 17:35

## 2021-08-29 RX ADMIN — Medication 6 UNIT(S): at 16:25

## 2021-08-29 RX ADMIN — Medication 81 MILLIGRAM(S): at 12:13

## 2021-08-29 RX ADMIN — HALOPERIDOL DECANOATE 2 MILLIGRAM(S): 100 INJECTION INTRAMUSCULAR at 02:12

## 2021-08-29 RX ADMIN — Medication 3 MILLIGRAM(S): at 23:14

## 2021-08-29 RX ADMIN — HEPARIN SODIUM 5000 UNIT(S): 5000 INJECTION INTRAVENOUS; SUBCUTANEOUS at 06:19

## 2021-08-29 RX ADMIN — INSULIN GLARGINE 20 UNIT(S): 100 INJECTION, SOLUTION SUBCUTANEOUS at 21:38

## 2021-08-29 RX ADMIN — Medication 6 UNIT(S): at 08:36

## 2021-08-29 RX ADMIN — AMLODIPINE BESYLATE 5 MILLIGRAM(S): 2.5 TABLET ORAL at 06:19

## 2021-08-29 NOTE — PHYSICAL THERAPY INITIAL EVALUATION ADULT - PERTINENT HX OF CURRENT PROBLEM, REHAB EVAL
As per ED notes, pt  admitted on 28 August 2021 with h/o fall, nose pain/swelling x 45 minutes. son denies LOC, CT spine and brain rules out any fractures and any acute changes, Ligia interpenetration utilized ([employer] [id number])

## 2021-08-29 NOTE — PHYSICAL THERAPY INITIAL EVALUATION ADULT - TRANSFER TRAINING, PT EVAL
Patient will transfer across all surfaces, with least restrictive assistive device , or better, with good balance in 4 weeks.

## 2021-08-29 NOTE — PHYSICAL THERAPY INITIAL EVALUATION ADULT - IMPAIRMENTS FOUND, PT EVAL
aerobic capacity/endurance/cognitive impairment/decreased midline orientation/ergonomics and body mechanics/gait, locomotion, and balance/poor safety awareness

## 2021-08-29 NOTE — PHYSICAL THERAPY INITIAL EVALUATION ADULT - GAIT TRAINING, PT EVAL
Patient will ambulate > 200 ft or more , with minimal assist or better, with good balance and endurance.

## 2021-08-29 NOTE — PHYSICAL THERAPY INITIAL EVALUATION ADULT - ADDITIONAL COMMENTS
As per patient, pt lives with his son and family, and independent in all mobility, pt appears to be confused, non oriented, pt unable to recall no of steps to enter to the house, unable to recall his date of birth, Pt denies previous fall, pt emergency contact is unavailable.

## 2021-08-29 NOTE — PHYSICAL THERAPY INITIAL EVALUATION ADULT - BALANCE TRAINING, PT EVAL
Patient will ambulate and function with good balance and endurance for all ADLs without falls risk, in 4 weeks.

## 2021-08-29 NOTE — CHART NOTE - NSCHARTNOTEFT_GEN_A_CORE
Informed by RN that pt has been increasingly agitated. Pt was observed awake, screaming not cooperating  Haldol 2 mg given IV with improvement     T(C): 36.7 (28 Aug 2021 23:09), Max: 36.9 (28 Aug 2021 07:19)  T(F): 98 (28 Aug 2021 23:09), Max: 98.5 (28 Aug 2021 07:19)  HR: 95 (28 Aug 2021 23:09) (79 - 98)  BP: 133/74 (28 Aug 2021 23:09) (126/66 - 147/79)  RR: 17 (28 Aug 2021 23:09) (15 - 19)  SpO2: 94% (28 Aug 2021 23:09) (92% - 100%)

## 2021-08-30 DIAGNOSIS — N17.9 ACUTE KIDNEY FAILURE, UNSPECIFIED: ICD-10-CM

## 2021-08-30 LAB
ANION GAP SERPL CALC-SCNC: 4 MMOL/L — LOW (ref 5–17)
BUN SERPL-MCNC: 11 MG/DL — SIGNIFICANT CHANGE UP (ref 7–23)
CALCIUM SERPL-MCNC: 8.1 MG/DL — LOW (ref 8.5–10.1)
CHLORIDE SERPL-SCNC: 101 MMOL/L — SIGNIFICANT CHANGE UP (ref 96–108)
CO2 SERPL-SCNC: 32 MMOL/L — HIGH (ref 22–31)
COVID-19 SPIKE DOMAIN AB INTERP: POSITIVE
COVID-19 SPIKE DOMAIN ANTIBODY RESULT: >250 U/ML — HIGH
CREAT SERPL-MCNC: 1.98 MG/DL — HIGH (ref 0.5–1.3)
GLUCOSE SERPL-MCNC: 137 MG/DL — HIGH (ref 70–99)
POTASSIUM SERPL-MCNC: 3.6 MMOL/L — SIGNIFICANT CHANGE UP (ref 3.5–5.3)
POTASSIUM SERPL-SCNC: 3.6 MMOL/L — SIGNIFICANT CHANGE UP (ref 3.5–5.3)
SARS-COV-2 IGG+IGM SERPL QL IA: >250 U/ML — HIGH
SARS-COV-2 IGG+IGM SERPL QL IA: POSITIVE
SODIUM SERPL-SCNC: 137 MMOL/L — SIGNIFICANT CHANGE UP (ref 135–145)

## 2021-08-30 PROCEDURE — 99232 SBSQ HOSP IP/OBS MODERATE 35: CPT

## 2021-08-30 RX ORDER — SODIUM CHLORIDE 9 MG/ML
1000 INJECTION INTRAMUSCULAR; INTRAVENOUS; SUBCUTANEOUS
Refills: 0 | Status: DISCONTINUED | OUTPATIENT
Start: 2021-08-30 | End: 2021-09-01

## 2021-08-30 RX ORDER — QUETIAPINE FUMARATE 200 MG/1
25 TABLET, FILM COATED ORAL ONCE
Refills: 0 | Status: COMPLETED | OUTPATIENT
Start: 2021-08-30 | End: 2021-08-30

## 2021-08-30 RX ADMIN — PANTOPRAZOLE SODIUM 40 MILLIGRAM(S): 20 TABLET, DELAYED RELEASE ORAL at 07:40

## 2021-08-30 RX ADMIN — SODIUM CHLORIDE 100 MILLILITER(S): 9 INJECTION INTRAMUSCULAR; INTRAVENOUS; SUBCUTANEOUS at 08:51

## 2021-08-30 RX ADMIN — Medication 6 UNIT(S): at 08:40

## 2021-08-30 RX ADMIN — Medication 3 MILLIGRAM(S): at 21:40

## 2021-08-30 RX ADMIN — INSULIN GLARGINE 20 UNIT(S): 100 INJECTION, SOLUTION SUBCUTANEOUS at 21:40

## 2021-08-30 RX ADMIN — LATANOPROST 1 DROP(S): 0.05 SOLUTION/ DROPS OPHTHALMIC; TOPICAL at 21:41

## 2021-08-30 RX ADMIN — HEPARIN SODIUM 5000 UNIT(S): 5000 INJECTION INTRAVENOUS; SUBCUTANEOUS at 06:10

## 2021-08-30 RX ADMIN — HEPARIN SODIUM 5000 UNIT(S): 5000 INJECTION INTRAVENOUS; SUBCUTANEOUS at 17:18

## 2021-08-30 RX ADMIN — QUETIAPINE FUMARATE 25 MILLIGRAM(S): 200 TABLET, FILM COATED ORAL at 15:46

## 2021-08-30 RX ADMIN — Medication 81 MILLIGRAM(S): at 13:32

## 2021-08-30 RX ADMIN — ATORVASTATIN CALCIUM 40 MILLIGRAM(S): 80 TABLET, FILM COATED ORAL at 21:39

## 2021-08-30 RX ADMIN — Medication 2: at 08:39

## 2021-08-30 NOTE — PROVIDER CONTACT NOTE (HYPOGLYCEMIA EVENT) - NS PROVIDER CONTACT BACKGROUND-HYPO
Age: 75y    Gender: Male    POCT Blood Glucose:  116 mg/dL (08-30-21 @ 16:55)  93 mg/dL (08-30-21 @ 13:05)  59 mg/dL (08-30-21 @ 11:32)  49 mg/dL (08-30-21 @ 11:30)  153 mg/dL (08-30-21 @ 07:51)  189 mg/dL (08-29-21 @ 21:33)      eMAR:atorvastatin   40 milliGRAM(s) Oral (08-29-21 @ 21:34)    insulin glargine Injectable (LANTUS)   20 Unit(s) SubCutaneous (08-29-21 @ 21:38)    insulin lispro (ADMELOG) corrective regimen sliding scale   2 Unit(s) SubCutaneous (08-30-21 @ 08:39)    insulin lispro Injectable (ADMELOG)   6 Unit(s) SubCutaneous (08-30-21 @ 08:40)

## 2021-08-30 NOTE — PROVIDER CONTACT NOTE (HYPOGLYCEMIA EVENT) - NS PROVIDER CONTACT CONTRIBUTING FACTORS OF EPISODE
pt is forgetful/Poor oral intake within the last 24 hours/Other (Specify) [Negative] : Allergic/Immunologic

## 2021-08-30 NOTE — PROVIDER CONTACT NOTE (HYPOGLYCEMIA EVENT) - NS PROVIDER CONTACT NOTE-TREATMENT-HYPO
3 cranberry juices and pt was given lunch (RN fed pt)/4 oz Fruit Juice (Specify quantity, date/time)

## 2021-08-30 NOTE — PROVIDER CONTACT NOTE (HYPOGLYCEMIA EVENT) - NS PROVIDER CONTACT SITUATION-HYPO
Patient's PO intake has been variable. he requires a lot of coaxing to eat and often defers meals. In the AM pt was given sliding scale and nutritional insulin but did not have much for breakfast

## 2021-08-31 LAB
ANION GAP SERPL CALC-SCNC: 1 MMOL/L — LOW (ref 5–17)
BUN SERPL-MCNC: 10 MG/DL — SIGNIFICANT CHANGE UP (ref 7–23)
CALCIUM SERPL-MCNC: 7.9 MG/DL — LOW (ref 8.5–10.1)
CHLORIDE SERPL-SCNC: 108 MMOL/L — SIGNIFICANT CHANGE UP (ref 96–108)
CO2 SERPL-SCNC: 31 MMOL/L — SIGNIFICANT CHANGE UP (ref 22–31)
CREAT SERPL-MCNC: 1.55 MG/DL — HIGH (ref 0.5–1.3)
GLUCOSE SERPL-MCNC: 167 MG/DL — HIGH (ref 70–99)
POTASSIUM SERPL-MCNC: 3.5 MMOL/L — SIGNIFICANT CHANGE UP (ref 3.5–5.3)
POTASSIUM SERPL-SCNC: 3.5 MMOL/L — SIGNIFICANT CHANGE UP (ref 3.5–5.3)
SODIUM SERPL-SCNC: 140 MMOL/L — SIGNIFICANT CHANGE UP (ref 135–145)

## 2021-08-31 PROCEDURE — 99239 HOSP IP/OBS DSCHRG MGMT >30: CPT

## 2021-08-31 RX ORDER — ATORVASTATIN CALCIUM 80 MG/1
1 TABLET, FILM COATED ORAL
Qty: 0 | Refills: 0 | DISCHARGE

## 2021-08-31 RX ORDER — AMLODIPINE BESYLATE 2.5 MG/1
1 TABLET ORAL
Qty: 0 | Refills: 0 | DISCHARGE
Start: 2021-08-31

## 2021-08-31 RX ORDER — QUETIAPINE FUMARATE 200 MG/1
25 TABLET, FILM COATED ORAL ONCE
Refills: 0 | Status: DISCONTINUED | OUTPATIENT
Start: 2021-08-31 | End: 2021-09-01

## 2021-08-31 RX ORDER — AMLODIPINE BESYLATE 2.5 MG/1
1 TABLET ORAL
Qty: 0 | Refills: 0 | DISCHARGE

## 2021-08-31 RX ORDER — LOSARTAN POTASSIUM 100 MG/1
1 TABLET, FILM COATED ORAL
Qty: 0 | Refills: 0 | DISCHARGE

## 2021-08-31 RX ORDER — SOD,AMMONIUM,POTASSIUM LACTATE
0 CREAM (GRAM) TOPICAL
Qty: 400 | Refills: 0 | DISCHARGE

## 2021-08-31 RX ADMIN — ATORVASTATIN CALCIUM 40 MILLIGRAM(S): 80 TABLET, FILM COATED ORAL at 21:44

## 2021-08-31 RX ADMIN — HEPARIN SODIUM 5000 UNIT(S): 5000 INJECTION INTRAVENOUS; SUBCUTANEOUS at 05:12

## 2021-08-31 RX ADMIN — Medication 100 MILLIGRAM(S): at 18:37

## 2021-08-31 RX ADMIN — Medication 100 MILLIGRAM(S): at 21:44

## 2021-08-31 RX ADMIN — Medication 81 MILLIGRAM(S): at 11:41

## 2021-08-31 RX ADMIN — Medication 2: at 11:20

## 2021-08-31 RX ADMIN — Medication 100 MILLIGRAM(S): at 05:12

## 2021-08-31 RX ADMIN — LATANOPROST 1 DROP(S): 0.05 SOLUTION/ DROPS OPHTHALMIC; TOPICAL at 21:44

## 2021-08-31 RX ADMIN — AMLODIPINE BESYLATE 5 MILLIGRAM(S): 2.5 TABLET ORAL at 05:12

## 2021-08-31 RX ADMIN — Medication 6 UNIT(S): at 11:20

## 2021-08-31 RX ADMIN — Medication 3 MILLIGRAM(S): at 21:47

## 2021-08-31 RX ADMIN — Medication 100 MILLIGRAM(S): at 09:20

## 2021-08-31 RX ADMIN — PANTOPRAZOLE SODIUM 40 MILLIGRAM(S): 20 TABLET, DELAYED RELEASE ORAL at 08:13

## 2021-08-31 NOTE — DISCHARGE NOTE PROVIDER - NSDCMRMEDTOKEN_GEN_ALL_CORE_FT
AMLOD/BENAZP CAP 5-10MG:   amLODIPine 5 mg oral tablet: 1 tab(s) orally once a day  aspirin 81 mg oral tablet, chewable: 1 tab(s) orally once a day  atorvastatin 40 mg oral tablet: 1 tab(s) orally once a day (at bedtime)  insulin lispro (concentrated) 200 units/mL subcutaneous solution: 8 unit(s) subcutaneous 3 times a day (before meals)   Lantus Solostar Pen 100 units/mL subcutaneous solution: 20 unit(s) subcutaneous once a day (at bedtime)   LATANOPROST  SOL 0.005%:   loratadine 10 mg oral tablet: 1 tab(s) orally once a day  METFORMIN    TAB 1000M unit(s) orally 2 times a day   Prandin 0.5 mg oral tablet: 1 tab(s) orally 3 times a day (before meals)   AMLOD/BENAZP CAP 5-10MG:   aspirin 81 mg oral tablet, chewable: 1 tab(s) orally once a day  atorvastatin 40 mg oral tablet: 1 tab(s) orally once a day (at bedtime)  insulin lispro (concentrated) 200 units/mL subcutaneous solution: 8 unit(s) subcutaneous 3 times a day (before meals)   Lantus Solostar Pen 100 units/mL subcutaneous solution: 20 unit(s) subcutaneous once a day (at bedtime)   LATANOPROST  SOL 0.005%:   loratadine 10 mg oral tablet: 1 tab(s) orally once a day  METFORMIN    TAB 1000M unit(s) orally 2 times a day   Prandin 0.5 mg oral tablet: 1 tab(s) orally 3 times a day (before meals)

## 2021-08-31 NOTE — DIETITIAN INITIAL EVALUATION ADULT. - PERTINENT LABORATORY DATA
08-31 Na140 mmol/L Glu 167 mg/dL<H> K+ 3.5 mmol/L Cr  1.55 mg/dL<H> BUN 10 mg/dL 08-28 Alb 2.3 g/dL<L>  08-29-21  A1C 13.5%, average glu 341; 08-30 POCT: 153, 49, 59, 93, 116, 106

## 2021-08-31 NOTE — DIETITIAN INITIAL EVALUATION ADULT. - OTHER CALCULATIONS
Ht (cm):    167.6  Wt (kg):  72.6 (8/28)    BMI:    25.8   IBW: 64.5 kg  %IBW:  113%  UBW: unknown  %UBW: unknown

## 2021-08-31 NOTE — DIETITIAN INITIAL EVALUATION ADULT. - LITERATURE/VIDEOS GIVEN
Provided [facility] Healthy [facility] [place] Nutrition Therapy & Plate Method handouts for family reference

## 2021-08-31 NOTE — DISCHARGE NOTE PROVIDER - HOSPITAL COURSE
74 yo gentleman PMH HTN, DM who presents to the ED with EMS after a reported fall, where he hit his nose. Pt is not able to answer questions, even with [facility]  which is his reported primary language. Says  'I don't know' to all questions. Pt son attempted to be called, but no responsive. Pt denies any complaints. Evidence of abrasion to nose, no epistaxis.    Plan: Falll at home, CT head no acute injuries, no nasal fracture. PT eval requested. 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 Will continue all home meds Norvasc 5 mg/day, ASA 81 mg/day, Lipitor 40 mg/day,   Protonix 40 mg/day, Lantus 20 and Admelog 6 units tid. Labs WNL. Glucose 117 on arrival. EKG is NSR, CXR is clear.     DISPO home w pt   haylee resolved      pt seen and examined 45 min spent on dc planning     Lab test review, Radiology Review, Vitals review, Consultant review and discussion, Physical examination, IDR, Assessment and plan; Plan discussion with patient and family    76 yo gentleman PMH HTN, DM who presents to the ED with EMS after a reported fall, where he hit his nose.     Plan:   # Falll at home, CT head no acute injuries, no nasal fracture. PT eval requested. 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 PT recommending CARLOS.  Option offered to pt's family, they are opting to discharge home.  Continue ASA, Statin.   # Essential HTN - Monitor BP.  Continue oral antihypertensives. BP stable, drop in BP on standing, however, no hypotensive episodes, no symptoms.  Maintain current BP meds.  # Diabetes Type II - monitor fingersticks.  Insulin coverage for hyperglycemia.  Lantus 20 and Admelog 6 units tid. Labs WNL.   # YANNICK - Appears to have CKD Stage III, Cr baseline is ~ 1.3-1.5.  Cr is stable.  OUtpatient f/u with PMD.     Disposition: Stable for discharge.  Outpatient followup discussed.  Total time spent on discharge is  45 minutes.

## 2021-08-31 NOTE — DIETITIAN INITIAL EVALUATION ADULT. - OTHER INFO
Unable to interview pt due to cognitive impairment; pt unable to use video  or respond to questions.  Left VM for son Macrina ([phone]) to c/b to discuss pt elevated A1c; no return call to date.  Per 2E CM, CM for [facility] who provide HHA (CDPAP is daughter-in-law) reports they are aware of uncontrolled DM status; they have tried to educate family but have been unsuccessful; allow pt to eat what he wants; [facility]; left educational material bedside for family reference.  No reports of any N/V/C/D or chew/swallowing difficulty.

## 2021-08-31 NOTE — DISCHARGE NOTE NURSING/CASE MANAGEMENT/SOCIAL WORK - PATIENT PORTAL LINK FT
You can access the FollowMyHealth Patient Portal offered by [facility] by registering at the following website: [email or web address]. By joining [employer]’s FollowMyHealth portal, you will also be able to view your health information using other applications (apps) compatible with our system.

## 2021-08-31 NOTE — DISCHARGE NOTE PROVIDER - NSDCFUADDINST_GEN_ALL_CORE_FT
It is important to see your primary physician as well as the physicians noted below within the next week to perform a comprehensive medical review.  Call their offices for an appointment as soon as you leave the hospital.  You will also need to see them for renewal of your medications.  If you do not have a primary physician, contact the [facility] Physician Referral Service at [phone].  To obtain your results, you can access the [email or web address] Patient Portal at [email or web address].  Your medical issues appear to be stable at this time, but if your symptoms recur or worsen, contact your physicians and/or return to the hospital if necessary.  If you encounter any issues or questions with your medication, call your physicians before stopping the medication.  Do not drive.  Limit your diet to 2 grams of sodium daily.

## 2021-08-31 NOTE — DISCHARGE NOTE PROVIDER - NSDCCPCAREPLAN_GEN_ALL_CORE_FT
PRINCIPAL DISCHARGE DIAGNOSIS  Diagnosis: Fall  Assessment and Plan of Treatment: continue physical therapy follow w your primary doctor       PRINCIPAL DISCHARGE DIAGNOSIS  Diagnosis: Fall  Assessment and Plan of Treatment: continue physical therapy follow w your primary doctor      SECONDARY DISCHARGE DIAGNOSES  Diagnosis: YANNICK (acute kidney injury)  Assessment and Plan of Treatment:     Diagnosis: CKD (chronic kidney disease), stage III  Assessment and Plan of Treatment:     Diagnosis: Diabetes  Assessment and Plan of Treatment:     Diagnosis: Hypertension  Assessment and Plan of Treatment:

## 2021-08-31 NOTE — DISCHARGE NOTE NURSING/CASE MANAGEMENT/SOCIAL WORK - NSDCPEFALRISK_GEN_ALL_CORE
For information on Fall & injury Prevention, visit [email or web address]

## 2021-09-01 VITALS
HEART RATE: 89 BPM | TEMPERATURE: 98 F | OXYGEN SATURATION: 96 % | DIASTOLIC BLOOD PRESSURE: 89 MMHG | RESPIRATION RATE: 18 BRPM | SYSTOLIC BLOOD PRESSURE: 143 MMHG

## 2021-09-01 LAB
ANION GAP SERPL CALC-SCNC: 3 MMOL/L — LOW (ref 5–17)
BUN SERPL-MCNC: 7 MG/DL — SIGNIFICANT CHANGE UP (ref 7–23)
CALCIUM SERPL-MCNC: 8.8 MG/DL — SIGNIFICANT CHANGE UP (ref 8.5–10.1)
CHLORIDE SERPL-SCNC: 107 MMOL/L — SIGNIFICANT CHANGE UP (ref 96–108)
CO2 SERPL-SCNC: 31 MMOL/L — SIGNIFICANT CHANGE UP (ref 22–31)
CREAT SERPL-MCNC: 1.61 MG/DL — HIGH (ref 0.5–1.3)
GLUCOSE BLDC GLUCOMTR-MCNC: 117 MG/DL — HIGH (ref 70–99)
GLUCOSE SERPL-MCNC: 143 MG/DL — HIGH (ref 70–99)
POTASSIUM SERPL-MCNC: 3.3 MMOL/L — LOW (ref 3.5–5.3)
POTASSIUM SERPL-SCNC: 3.3 MMOL/L — LOW (ref 3.5–5.3)
SODIUM SERPL-SCNC: 141 MMOL/L — SIGNIFICANT CHANGE UP (ref 135–145)

## 2021-09-01 PROCEDURE — 99232 SBSQ HOSP IP/OBS MODERATE 35: CPT

## 2021-09-01 RX ORDER — POTASSIUM CHLORIDE 20 MEQ
40 PACKET (EA) ORAL ONCE
Refills: 0 | Status: COMPLETED | OUTPATIENT
Start: 2021-09-01 | End: 2021-09-01

## 2021-09-01 RX ADMIN — Medication 2: at 08:37

## 2021-09-01 RX ADMIN — Medication 100 MILLIGRAM(S): at 05:19

## 2021-09-01 RX ADMIN — PANTOPRAZOLE SODIUM 40 MILLIGRAM(S): 20 TABLET, DELAYED RELEASE ORAL at 08:35

## 2021-09-01 RX ADMIN — Medication 81 MILLIGRAM(S): at 12:26

## 2021-09-01 RX ADMIN — Medication 100 MILLIGRAM(S): at 15:50

## 2021-09-01 RX ADMIN — HEPARIN SODIUM 5000 UNIT(S): 5000 INJECTION INTRAVENOUS; SUBCUTANEOUS at 17:02

## 2021-09-01 RX ADMIN — Medication 40 MILLIEQUIVALENT(S): at 12:26

## 2021-09-01 RX ADMIN — HEPARIN SODIUM 5000 UNIT(S): 5000 INJECTION INTRAVENOUS; SUBCUTANEOUS at 05:19

## 2021-09-01 RX ADMIN — Medication 6 UNIT(S): at 08:36

## 2021-09-01 NOTE — PROGRESS NOTE ADULT - SUBJECTIVE AND OBJECTIVE BOX
Patient is a 75y old  Male who presents with a chief complaint of Fall at home (31 Aug 2021 12:59)    INTERVAL HPI/OVERNIGHT EVENTS: no events     MEDICATIONS  (STANDING):  amLODIPine   Tablet 5 milliGRAM(s) Oral daily  aspirin enteric coated 81 milliGRAM(s) Oral daily  atorvastatin 40 milliGRAM(s) Oral at bedtime  benzonatate 100 milliGRAM(s) Oral three times a day  dextrose 40% Gel 15 Gram(s) Oral once  dextrose 5%. 1000 milliLiter(s) (50 mL/Hr) IV Continuous <Continuous>  dextrose 5%. 1000 milliLiter(s) (100 mL/Hr) IV Continuous <Continuous>  dextrose 50% Injectable 25 Gram(s) IV Push once  dextrose 50% Injectable 12.5 Gram(s) IV Push once  dextrose 50% Injectable 25 Gram(s) IV Push once  glucagon  Injectable 1 milliGRAM(s) IntraMuscular once  heparin   Injectable 5000 Unit(s) SubCutaneous every 12 hours  insulin glargine Injectable (LANTUS) 20 Unit(s) SubCutaneous at bedtime  insulin lispro (ADMELOG) corrective regimen sliding scale   SubCutaneous three times a day before meals  insulin lispro Injectable (ADMELOG) 6 Unit(s) SubCutaneous three times a day before meals  latanoprost 0.005% Ophthalmic Solution 1 Drop(s) Both EYES at bedtime  pantoprazole    Tablet 40 milliGRAM(s) Oral before breakfast  sodium chloride 0.9%. 1000 milliLiter(s) (100 mL/Hr) IV Continuous <Continuous>    MEDICATIONS  (PRN):  guaiFENesin Oral Liquid (Sugar-Free) 100 milliGRAM(s) Oral every 6 hours PRN Cough  melatonin 3 milliGRAM(s) Oral at bedtime PRN Insomnia    Allergies    No Known Allergies    Intolerances      REVIEW OF SYSTEMS:  All other systems reviewed and are negative    Vital Signs Last 24 Hrs  T(C): 36.2 (31 Aug 2021 11:02), Max: 36.6 (30 Aug 2021 21:00)  T(F): 97.2 (31 Aug 2021 11:02), Max: 97.9 (31 Aug 2021 05:30)  HR: 91 (31 Aug 2021 11:02) (86 - 111)  BP: 142/71 (31 Aug 2021 11:02) (131/72 - 164/82)  BP(mean): --  RR: 18 (31 Aug 2021 11:02) (17 - 18)  SpO2: 95% (31 Aug 2021 11:02) (95% - 98%)  Daily     Daily   I&O's Summary    30 Aug 2021 07:01  -  31 Aug 2021 07:00  --------------------------------------------------------  IN: 1200 mL / OUT: 0 mL / NET: 1200 mL    31 Aug 2021 07:01  -  31 Aug 2021 15:01  --------------------------------------------------------  IN: 120 mL / OUT: 0 mL / NET: 120 mL      CAPILLARY BLOOD GLUCOSE      POCT Blood Glucose.: 172 mg/dL (31 Aug 2021 11:12)  POCT Blood Glucose.: 135 mg/dL (31 Aug 2021 07:52)  POCT Blood Glucose.: 106 mg/dL (30 Aug 2021 21:21)  POCT Blood Glucose.: 116 mg/dL (30 Aug 2021 16:55)    PHYSICAL EXAM:  GENERAL: NAD,    HEAD:  Atraumatic, Normocephalic  EYES: EOMI, PERRLA, conjunctiva and sclera clear  ENMT: No tonsillar erythema, exudates, or enlargement; Moist mucous membranes, Good dentition, No lesions  NECK: Supple, No JVD, Normal thyroid  CHEST/LUNG: Clear to percussion bilaterally; No rales, rhonchi, wheezing, or rubs  HEART: Regular rate and rhythm; No murmurs, rubs, or gallops  ABDOMEN: Soft, Nontender, Nondistended; Bowel sounds present  EXTREMITIES:  2+ Peripheral Pulses, No clubbing, cyanosis, or edema  LYMPH: No lymphadenopathy noted  SKIN: No rashes or lesions      Labs      08-31    140  |  108  |  10  ----------------------------<  167<H>  3.5   |  31  |  1.55<H>    Ca    7.9<L>      31 Aug 2021 10:20                Culture - Urine (collected 28 Aug 2021 18:49)  Source: Clean Catch Clean Catch (Midstream)  Final Report (29 Aug 2021 16:34):    <10,000 CFU/mL Normal Urogenital Tracey                DVT prophylaxis: > Lovenox 40mg SQ daily  > Heparin   > SCD's
                          Patient: JAMEEL MAYER [id number] 75y Male                            Hospitalist Attending Note    Seen with RN at bedside.  Taking po intake.  No dizziness.  VItals: laying 150/64 P 94 -> Sitting 131/78 P 94 Standing 134/89 P 97.       ____________________PHYSICAL EXAM:  GENERAL:  NAD, alert, confused.   HEENT: NCAT  CARDIOVASCULAR:  S1, S2  LUNGS: CTAB  ABDOMEN:  soft, (-) tenderness, (-) distension, (+) bowel sounds, (-) guarding, (-) rebound (-) rigidity  EXTREMITIES:  no cyanosis / clubbing / edema.   ____________________    VITALS:  Vital Signs Last 24 Hrs  T(C): 36.4 (01 Sep 2021 10:51), Max: 36.7 (31 Aug 2021 17:27)  T(F): 97.5 (01 Sep 2021 10:51), Max: 98.1 (31 Aug 2021 17:27)  HR: 97 (01 Sep 2021 11:55) (76 - 97)  BP: 134/89 (01 Sep 2021 11:55) (128/78 - 157/80)  BP(mean): --  RR: 17 (01 Sep 2021 10:51) (17 - 17)  SpO2: 96% (01 Sep 2021 10:51) (96% - 100%) Daily     Daily   CAPILLARY BLOOD GLUCOSE      POCT Blood Glucose.: 83 mg/dL (01 Sep 2021 12:34)  POCT Blood Glucose.: 172 mg/dL (01 Sep 2021 08:23)  POCT Blood Glucose.: 89 mg/dL (31 Aug 2021 21:50)  POCT Blood Glucose.: 86 mg/dL (31 Aug 2021 16:00)    I&O's Summary    31 Aug 2021 07:01  -  01 Sep 2021 07:00  --------------------------------------------------------  IN: 360 mL / OUT: 0 mL / NET: 360 mL    01 Sep 2021 07:01  -  01 Sep 2021 12:50  --------------------------------------------------------  IN: 237 mL / OUT: 0 mL / NET: 237 mL        LABS:    09-01    141  |  107  |  7   ----------------------------<  143<H>  3.3<L>   |  31  |  1.61<H>    Ca    8.8      01 Sep 2021 10:33                    MEDICATIONS:  amLODIPine   Tablet 5 milliGRAM(s) Oral daily  aspirin enteric coated 81 milliGRAM(s) Oral daily  atorvastatin 40 milliGRAM(s) Oral at bedtime  benzonatate 100 milliGRAM(s) Oral three times a day  dextrose 40% Gel 15 Gram(s) Oral once  dextrose 5%. 1000 milliLiter(s) IV Continuous <Continuous>  dextrose 5%. 1000 milliLiter(s) IV Continuous <Continuous>  dextrose 50% Injectable 25 Gram(s) IV Push once  dextrose 50% Injectable 12.5 Gram(s) IV Push once  dextrose 50% Injectable 25 Gram(s) IV Push once  glucagon  Injectable 1 milliGRAM(s) IntraMuscular once  guaiFENesin Oral Liquid (Sugar-Free) 100 milliGRAM(s) Oral every 6 hours PRN  heparin   Injectable 5000 Unit(s) SubCutaneous every 12 hours  insulin glargine Injectable (LANTUS) 20 Unit(s) SubCutaneous at bedtime  insulin lispro (ADMELOG) corrective regimen sliding scale   SubCutaneous three times a day before meals  insulin lispro Injectable (ADMELOG) 6 Unit(s) SubCutaneous three times a day before meals  latanoprost 0.005% Ophthalmic Solution 1 Drop(s) Both EYES at bedtime  melatonin 3 milliGRAM(s) Oral at bedtime PRN  pantoprazole    Tablet 40 milliGRAM(s) Oral before breakfast  QUEtiapine 25 milliGRAM(s) Oral once  sodium chloride 0.9%. 1000 milliLiter(s) IV Continuous <Continuous>    
Patient is a 75y old  Male who presents with a chief complaint of Fall at home (29 Aug 2021 12:35)    INTERVAL HPI/OVERNIGHT EVENTS: no events     MEDICATIONS  (STANDING):  amLODIPine   Tablet 5 milliGRAM(s) Oral daily  aspirin enteric coated 81 milliGRAM(s) Oral daily  atorvastatin 40 milliGRAM(s) Oral at bedtime  dextrose 40% Gel 15 Gram(s) Oral once  dextrose 5%. 1000 milliLiter(s) (50 mL/Hr) IV Continuous <Continuous>  dextrose 5%. 1000 milliLiter(s) (100 mL/Hr) IV Continuous <Continuous>  dextrose 50% Injectable 25 Gram(s) IV Push once  dextrose 50% Injectable 12.5 Gram(s) IV Push once  dextrose 50% Injectable 25 Gram(s) IV Push once  glucagon  Injectable 1 milliGRAM(s) IntraMuscular once  heparin   Injectable 5000 Unit(s) SubCutaneous every 12 hours  insulin glargine Injectable (LANTUS) 20 Unit(s) SubCutaneous at bedtime  insulin lispro (ADMELOG) corrective regimen sliding scale   SubCutaneous three times a day before meals  insulin lispro Injectable (ADMELOG) 6 Unit(s) SubCutaneous three times a day before meals  latanoprost 0.005% Ophthalmic Solution 1 Drop(s) Both EYES at bedtime  pantoprazole    Tablet 40 milliGRAM(s) Oral before breakfast  sodium chloride 0.9%. 1000 milliLiter(s) (100 mL/Hr) IV Continuous <Continuous>    MEDICATIONS  (PRN):  melatonin 3 milliGRAM(s) Oral at bedtime PRN Insomnia    Allergies    No Known Allergies    Intolerances      REVIEW OF SYSTEMS:  All other systems reviewed and are negative    Vital Signs Last 24 Hrs  T(C): 36.4 (30 Aug 2021 05:00), Max: 36.7 (29 Aug 2021 23:30)  T(F): 97.6 (30 Aug 2021 05:00), Max: 98 (29 Aug 2021 23:30)  HR: 84 (30 Aug 2021 05:00) (64 - 99)  BP: 108/69 (30 Aug 2021 05:00) (108/69 - 129/71)  BP(mean): --  RR: 17 (30 Aug 2021 05:00) (17 - 17)  SpO2: 95% (30 Aug 2021 05:00) (95% - 99%)  Daily     Daily   I&O's Summary    29 Aug 2021 07:01  -  30 Aug 2021 07:00  --------------------------------------------------------  IN: 240 mL / OUT: 800 mL / NET: -560 mL      CAPILLARY BLOOD GLUCOSE      POCT Blood Glucose.: 153 mg/dL (30 Aug 2021 07:51)  POCT Blood Glucose.: 189 mg/dL (29 Aug 2021 21:33)  POCT Blood Glucose.: 150 mg/dL (29 Aug 2021 16:13)  POCT Blood Glucose.: 74 mg/dL (29 Aug 2021 11:21)  POCT Blood Glucose.: 61 mg/dL (29 Aug 2021 11:05)    PHYSICAL EXAM:  GENERAL: NAD,    HEAD:  Atraumatic, Normocephalic  EYES: EOMI, PERRLA, conjunctiva and sclera clear  ENMT: No tonsillar erythema, exudates, or enlargement; Moist mucous membranes, Good dentition, No lesions  NECK: Supple, No JVD, Normal thyroid  CHEST/LUNG: Clear to percussion bilaterally; No rales, rhonchi, wheezing, or rubs  HEART: Regular rate and rhythm; No murmurs, rubs, or gallops  ABDOMEN: Soft, Nontender, Nondistended; Bowel sounds present  EXTREMITIES:  2+ Peripheral Pulses, No clubbing, cyanosis, or edema  LYMPH: No lymphadenopathy noted  SKIN: No rashes or lesions    Labs          137  |  101  |  11  ----------------------------<  137<H>  3.6   |  32<H>  |  1.98<H>    Ca    8.1<L>      30 Aug 2021 06:30            Urinalysis Basic - ( 28 Aug 2021 11:56 )    Color: Yellow / Appearance: Clear / S.015 / pH: x  Gluc: x / Ketone: Negative  / Bili: Negative / Urobili: Negative mg/dL   Blood: x / Protein: 100 mg/dL / Nitrite: Negative   Leuk Esterase: Negative / RBC: x / WBC x   Sq Epi: x / Non Sq Epi: Occasional / Bacteria: Occasional        Culture - Urine (collected 28 Aug 2021 18:49)  Source: Clean Catch Clean Catch (Midstream)  Final Report (29 Aug 2021 16:34):    <10,000 CFU/mL Normal Urogenital Tracey                DVT prophylaxis: > Lovenox 40mg SQ daily  > Heparin   > SCD's
Patient is a 75y old  Male who presents with a chief complaint of Fall at home (28 Aug 2021 11:21)    INTERVAL HPI/OVERNIGHT EVENTS: no events     MEDICATIONS  (STANDING):  amLODIPine   Tablet 5 milliGRAM(s) Oral daily  aspirin enteric coated 81 milliGRAM(s) Oral daily  atorvastatin 40 milliGRAM(s) Oral at bedtime  dextrose 40% Gel 15 Gram(s) Oral once  dextrose 5%. 1000 milliLiter(s) (50 mL/Hr) IV Continuous <Continuous>  dextrose 5%. 1000 milliLiter(s) (100 mL/Hr) IV Continuous <Continuous>  dextrose 50% Injectable 25 Gram(s) IV Push once  dextrose 50% Injectable 12.5 Gram(s) IV Push once  dextrose 50% Injectable 25 Gram(s) IV Push once  glucagon  Injectable 1 milliGRAM(s) IntraMuscular once  heparin   Injectable 5000 Unit(s) SubCutaneous every 12 hours  insulin glargine Injectable (LANTUS) 20 Unit(s) SubCutaneous at bedtime  insulin lispro (ADMELOG) corrective regimen sliding scale   SubCutaneous three times a day before meals  insulin lispro Injectable (ADMELOG) 6 Unit(s) SubCutaneous three times a day before meals  latanoprost 0.005% Ophthalmic Solution 1 Drop(s) Both EYES at bedtime  pantoprazole    Tablet 40 milliGRAM(s) Oral before breakfast    MEDICATIONS  (PRN):    Allergies    No Known Allergies    Intolerances      REVIEW OF SYSTEMS:  All other systems reviewed and are negative    Vital Signs Last 24 Hrs  T(C): 36.5 (29 Aug 2021 05:15), Max: 36.7 (28 Aug 2021 23:09)  T(F): 97.7 (29 Aug 2021 05:15), Max: 98 (28 Aug 2021 23:09)  HR: 79 (29 Aug 2021 05:15) (79 - 98)  BP: 129/75 (29 Aug 2021 05:15) (129/75 - 147/79)  BP(mean): --  RR: 18 (29 Aug 2021 05:15) (17 - 18)  SpO2: 95% (29 Aug 2021 05:15) (92% - 95%)  Daily     Daily   I&O's Summary    28 Aug 2021 07:01  -  29 Aug 2021 07:00  --------------------------------------------------------  IN: 690 mL / OUT: 0 mL / NET: 690 mL      CAPILLARY BLOOD GLUCOSE      POCT Blood Glucose.: 74 mg/dL (29 Aug 2021 11:21)  POCT Blood Glucose.: 61 mg/dL (29 Aug 2021 11:05)  POCT Blood Glucose.: 273 mg/dL (29 Aug 2021 08:33)  POCT Blood Glucose.: 239 mg/dL (28 Aug 2021 21:26)  POCT Blood Glucose.: 99 mg/dL (28 Aug 2021 16:41)    PHYSICAL EXAM:  GENERAL: NAD,    HEAD:  Atraumatic, Normocephalic  EYES: EOMI, PERRLA, conjunctiva and sclera clear  ENMT: No tonsillar erythema, exudates, or enlargement; Moist mucous membranes, Good dentition, No lesions  NECK: Supple, No JVD, Normal thyroid  NERVOUS SYSTEM:  Alert & Oriented X3, Good concentration; Motor Strength 5/5 B/L upper and lower extremities; DTRs 2+ intact and symmetric  CHEST/LUNG: Clear to percussion bilaterally; No rales, rhonchi, wheezing, or rubs  HEART: Regular rate and rhythm; No murmurs, rubs, or gallops  ABDOMEN: Soft, Nontender, Nondistended; Bowel sounds present  EXTREMITIES:  2+ Peripheral Pulses, No clubbing, cyanosis, or edema  LYMPH: No lymphadenopathy noted  SKIN: No rashes or lesions    Labs                          12.3   8.72  )-----------( 260      ( 28 Aug 2021 04:09 )             35.9         135  |  100  |  7   ----------------------------<  117<H>  3.4<L>   |  29  |  1.27    Ca    8.0<L>      28 Aug 2021 04:09    TPro  7.0  /  Alb  2.3<L>  /  TBili  0.6  /  DBili  x   /  AST  8<L>  /  ALT  8<L>  /  AlkPhos  88      PT/INR - ( 28 Aug 2021 04:09 )   PT: 11.5 sec;   INR: 0.99 ratio         PTT - ( 28 Aug 2021 04:09 )  PTT:28.4 sec  CARDIAC MARKERS ( 28 Aug 2021 04:09 )  <.015 ng/mL / x     / x     / x     / x          Urinalysis Basic - ( 28 Aug 2021 11:56 )    Color: Yellow / Appearance: Clear / S.015 / pH: x  Gluc: x / Ketone: Negative  / Bili: Negative / Urobili: Negative mg/dL   Blood: x / Protein: 100 mg/dL / Nitrite: Negative   Leuk Esterase: Negative / RBC: x / WBC x   Sq Epi: x / Non Sq Epi: Occasional / Bacteria: Occasional                  DVT prophylaxis: > Lovenox 40mg SQ daily  > Heparin   > SCD's

## 2021-09-03 DIAGNOSIS — Z79.899 OTHER LONG TERM (CURRENT) DRUG THERAPY: ICD-10-CM

## 2021-09-03 DIAGNOSIS — Z79.4 LONG TERM (CURRENT) USE OF INSULIN: ICD-10-CM

## 2021-09-03 DIAGNOSIS — N18.30 CHRONIC KIDNEY DISEASE, STAGE 3 UNSPECIFIED: ICD-10-CM

## 2021-09-03 DIAGNOSIS — Z79.82 LONG TERM (CURRENT) USE OF ASPIRIN: ICD-10-CM

## 2021-09-03 DIAGNOSIS — S00.31XA ABRASION OF NOSE, INITIAL ENCOUNTER: ICD-10-CM

## 2021-09-03 DIAGNOSIS — Z86.73 PERSONAL HISTORY OF TRANSIENT ISCHEMIC ATTACK (TIA), AND CEREBRAL INFARCTION WITHOUT RESIDUAL DEFICITS: ICD-10-CM

## 2021-09-03 DIAGNOSIS — E11.65 TYPE 2 DIABETES MELLITUS WITH HYPERGLYCEMIA: ICD-10-CM

## 2021-09-03 DIAGNOSIS — F03.91 UNSPECIFIED DEMENTIA WITH BEHAVIORAL DISTURBANCE: ICD-10-CM
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
## 2021-09-03 DIAGNOSIS — N17.9 ACUTE KIDNEY FAILURE, UNSPECIFIED: ICD-10-CM

## 2021-09-03 DIAGNOSIS — I12.9 HYPERTENSIVE CHRONIC KIDNEY DISEASE WITH STAGE 1 THROUGH STAGE 4 CHRONIC KIDNEY DISEASE, OR UNSPECIFIED CHRONIC KIDNEY DISEASE: ICD-10-CM

## 2021-09-03 DIAGNOSIS — E11.22 TYPE 2 DIABETES MELLITUS WITH DIABETIC CHRONIC KIDNEY DISEASE: ICD-10-CM

## 2022-01-27 NOTE — ED PROVIDER NOTE - OBJECTIVE STATEMENT
73 y/o M pt with PMHx of HTN and DM and no significant PSHx presents to ED c/o generalized abdominal pain x3-4 days. Pt describes abdominal pain as non-radiating. Pt reports having had constipation but was able to have a normal BM at 11:00am today. Pt denies fever, chills, nausea, vomiting, CP, dysuria, burning with urination, or any other complaints. Pt also denies Hx of abdominal surgeries in the past. NKDA. 71 y/o M pt with PMHx of HTN and DM and no significant PSHx sent by PMD to ED c/o generalized abdominal pain x3-4 days. Pt describes abdominal pain as non-radiating. Pt reports having had constipation but was able to have a normal BM at 11:00am today. Pt denies fever, chills, nausea, vomiting, CP, dysuria, burning with urination, or any other complaints. Pt also denies Hx of abdominal surgeries in the past. PMD: Dr. Juan C Nunes. THAI. 4

## 2022-09-26 NOTE — PHYSICAL THERAPY INITIAL EVALUATION ADULT - LEVEL OF INDEPENDENCE: STAND/SIT, REHAB EVAL
Detail Level: Generalized
Detail Level: Zone
Detail Level: Simple
minimum assist (75% patients effort)

## 2023-04-18 NOTE — DISCHARGE NOTE PROVIDER - CARE PROVIDER_API CALL
Virgie Ji)  Endocrinology[facility]ab[facility]abetes  [address] [place] [address]  Phone: [phone]  Fax: [phone]  Follow Up Time: 1-3 days    shonda sohrt  Phone: [phone]  Fax: (   )    -  Follow Up Time: 1 week
Statement Selected

## 2023-09-22 NOTE — CONSULT NOTE ADULT - NS NEC GEN PE MLT EXAM PC
WORK EXCUSE LETTER    September 22, 2023      Regarding:   Mateo Rouse   [address]  [place] [address]           This is to certify that Mateo Rouse had an appointment at this clinic for professional attention on the above date.    Please excuse him from Work due to  appointment .      Comments:  none    Electronically signed on 9/22/2023 at 2:41 PM by: MD Jared Angulo MD  [address] [place] [address] [address]  [place] [address]  [phone]   detailed exam

## 2023-11-09 NOTE — PATIENT PROFILE ADULT - LANGUAGE ASSISTANCE NEEDED
Yes-Patient/Caregiver accepts free interpretation services...
Courageous/Expressive of emotions/Intact family/Motivated/Self confidence